# Patient Record
Sex: FEMALE | Race: WHITE | NOT HISPANIC OR LATINO | Employment: UNEMPLOYED | URBAN - METROPOLITAN AREA
[De-identification: names, ages, dates, MRNs, and addresses within clinical notes are randomized per-mention and may not be internally consistent; named-entity substitution may affect disease eponyms.]

---

## 2020-04-13 ENCOUNTER — TELEPHONE (OUTPATIENT)
Dept: GASTROENTEROLOGY | Facility: CLINIC | Age: 35
End: 2020-04-13

## 2020-04-13 ENCOUNTER — TELEMEDICINE (OUTPATIENT)
Dept: GASTROENTEROLOGY | Facility: CLINIC | Age: 35
End: 2020-04-13

## 2020-04-13 DIAGNOSIS — R19.8 FUNCTIONAL GASTROINTESTINAL SYMPTOMS: ICD-10-CM

## 2020-04-13 DIAGNOSIS — R10.9 RIGHT SIDED ABDOMINAL PAIN: ICD-10-CM

## 2020-04-13 DIAGNOSIS — K21.9 CHRONIC GERD: ICD-10-CM

## 2020-04-13 DIAGNOSIS — K21.9 CHRONIC GERD: Primary | ICD-10-CM

## 2020-04-13 DIAGNOSIS — R10.9 CHRONIC ABDOMINAL PAIN: Primary | ICD-10-CM

## 2020-04-13 DIAGNOSIS — G89.29 CHRONIC ABDOMINAL PAIN: Primary | ICD-10-CM

## 2020-04-13 PROCEDURE — 99203 OFFICE O/P NEW LOW 30 MIN: CPT | Performed by: NURSE PRACTITIONER

## 2020-04-13 NOTE — PROGRESS NOTES
GASTROENTEROLOGY OUTPATIENT NEW PATIENT NOTE Video/Telemedicine Visit  Patient: MARCI ANDERS : 1985  Date of Service: 2020  CC: Re-establishing care for abdominal pain    Assessment/Plan                                             ASSESSMENT & PLANS      Chronic abdominal pain r/t chronic GERD, IBS/functional GI sx, myofascia, and gynecologic cause  Functional gastrointestinal symptoms  · A trial of FODMAP and/or gluten-free diet.  Pt has multiple allergies.  Celiac serology negative.    Chronic GERD  · H-pylori breath test  · Start pantoprazole (PROTONIX) 40 MG EC tablet; Take 1 tablet by mouth Every Morning. Take on an EMPTY STOMACH 30 min to 1hr BEFORE the first meal of the day  · EGD if sx persists despite medical and dietary changes.     Follow Up:  RTC in 2 months.      DISCUSSION: The above plan was delineated in details with pt and all questions and concerns were answered.  Patient is also given contact information.  Patient is to return as scheduled or sooner if new problems arise.   I personally spent 30 minutes face to face (via video visit) with the patient in counseling and discussion and/or coordination of care as described above. This was an audio and video enabled telemedicine encounter.  Subjective                                                     SUBJECTIVE   History of Present Illness  Ms. Marci Anders is a 34 y.o. female presents as a telemedicine visit via video for a f/u on chronic abd pain.  Pt was last seen in 2016.     She continues to have persistent right side abd pain x 4 yrs.  Pain in RUQ and RLQ Pain is intermittent, occurring daily. Usually mild, but it gets severe once or twice a wk. Wakes pt up at night.  Sharp/stabbing.  Lasting for min or hours. Postprandial pain. Sometimes no triggered.   There is no alleviating factor for pain other than resting and waited out.    Patient has associated early satiety, nausea without vomiting. Nausea is relieved with  PRN Phenergan.  Reported unintentional wt loss of 8-10 lbs w/in 6 months    BM daily or 2-3 times a day. Taking benefiber once or twice a day. w/ soft loose stool. Strains b/c RLQ pain.     Only drinks water and limited caffeine (2 cups of coffee a day).  Seldom ETOH.   Doing PT intermittent about 4 yrs for chronic back and sciatica pain. Denies NSAIDS. Took Prednisone PRN, which works better than on muscle relaxants. She had 3 rounds of steroid w/in the last 15 months  Pt has myofascial pelvic pain syndrome, endometriosis, and L ovarian cyst. She had Mirenda placed in November (w/ plan for 6 months)    Allergic to bug wheat and barley, shellfish, tree nuts, pollens via allergies testing.  No celiac disease in the family, but mom feels better with being celiac diet  She has not been able to allergy shots as recommended d/t COVID-19     No recent EGD. She had an EGD at age 17 and reportedly it was WNL.   Colonoscopy, done on 3/23/16 by Dr Mathew d/t right side abd pain, was unrevealing. Random ileocolonic bx showed focal mucosal fibrosis and muciphage infiltration suggesting reparative process, colon mucosa    Review of Systems   A complete 10-system ROS performed and documented w/ pertinent findings included in HPI.    Subjective   PAST MED HX: Pt  has a past medical history of Asthma and Environmental allergies.  PAST SURG HX: Pt  has a past surgical history that includes  section and Breast lumpectomy (Right, ).  FAM HX: family history is not on file.  SOC HX: Pt  reports that she has never smoked. She does not have any smokeless tobacco history on file. She reports that she drinks alcohol.    Objective                                                           OBJECTIVE   Allergy: Pt is allergic to shellfish-derived products and penicillins.  MEDS: •  cetirizine (zyrTEC) 10 MG tablet, Take 10 mg by mouth Daily., Disp: , Rfl:   •  clindamycin (CLEOCIN T) 1 % lotion, Apply  topically to the appropriate area  as directed 2 (Two) Times a Day., Disp: , Rfl:     •  FIBER PO, Take  by mouth., Disp: , Rfl:   •  fluconazole (DIFLUCAN) 100 MG tablet, Take 100 mg by mouth Daily., Disp: , Rfl:   •  fluticasone (FLONASE) 50 MCG/ACT nasal spray, 2 sprays into the nostril(s) as directed by provider Daily., Disp: , Rfl:   •  methylPREDNISolone (MEDROL, THERESE,) 4 MG tablet, Take as directed on package instructions., Disp: 1 each, Rfl: 0 Prednisone eye drop x 9 days (completed 2 days so far)  •  montelukast (SINGULAIR) 10 MG tablet, Take 10 mg by mouth every night., Disp: , Rfl:   •  Probiotic Product (PROBIOTIC DAILY PO), Take  by mouth., Disp: , Rfl:   · MVI   · Aldactone for actne  Medications reviewed w/ pt    Lab Results   Component Value Date    WBC 6.71 04/06/2020    WBC 8.96 03/09/2020    WBC 6.22 01/29/2016    EOSABS 0.01 04/06/2020    EOSABS 0.01 03/09/2020    EOSABS 0.03 (L) 01/29/2016    HGB 14.0 04/06/2020    HGB 15.9 03/09/2020    HGB 13.6 01/29/2016    HCT 42.3 04/06/2020    HCT 46.4 (H) 03/09/2020    HCT 38.6 01/29/2016    MCV 93.4 04/06/2020    MCV 91.7 03/09/2020    MCV 84.1 01/29/2016    MCHC 33.1 04/06/2020    MCHC 34.3 03/09/2020    MCHC 35.2 01/29/2016     04/06/2020     03/09/2020     01/29/2016     Lab Results   Component Value Date    RDW 13.3 04/06/2020    RDW 12.8 03/09/2020    MCHC 33.1 04/06/2020    MCHC 34.3 03/09/2020    MCHC 35.2 01/29/2016    LABIRON 37 03/09/2020    FERRITIN 32.9 03/09/2020     Lab Results   Component Value Date     03/09/2020    K 4.3 03/09/2020    CL 97 (L) 03/09/2020    CO2 26 03/09/2020    BUN 15 03/09/2020    BUN 13 12/18/2018    BUN 11 08/20/2018    CREATININE 0.5 (L) 03/09/2020    CREATININE 0.5 (L) 12/18/2018    CREATININE 0.6 (L) 08/20/2018    GLUCOSE 78 01/29/2016    CALCIUM 9.5 03/09/2020    ANIONGAP 10 01/29/2016     Lab Results   Component Value Date    AST 30 03/09/2020    AST 24 12/18/2018    AST 23 08/20/2018    ALT 34 03/09/2020    ALT 35  12/18/2018    ALT 23 08/20/2018    ALKPHOS 53 03/09/2020    ALKPHOS 43 12/18/2018    ALKPHOS 45 08/20/2018    BILITOT 0.3 03/09/2020    BILITOT 0.4 12/18/2018    BILITOT 0.2 08/20/2018    ALBUMIN 5.0 03/09/2020    ALBUMIN 4.8 12/18/2018    ALBUMIN 4.6 08/20/2018      Lab Results   Component Value Date    HGBA1C 4.8 08/20/2018    TSH 1.840 03/09/2020    TSH 1.920 12/18/2018     No results found for: INR  Lab Results   Component Value Date    CRP 15.6 (H) 02/11/2016    .2 (H) 02/05/2016      Wt Readings from Last 5 Encounters:   02/24/20 58.5 kg (129 lb)   12/09/19 60.3 kg (133 lb)   04/14/16 60.3 kg (133 lb)   body mass index is unknown because there is no height or weight on file., , ,    Physical Exam    General Well developed; well nourished. NAD  Psych Oriented to time, place, and person.  Appropriate affect       Pt care team: Shyanne GANNON & TERESSA Mckinnon  04/13/20 1:31 PM  [facility]--Gastroenterology  [phone]    CC: , No Known   [facility] / [facility] [id number] FAX:None

## 2020-04-13 NOTE — TELEPHONE ENCOUNTER
Shyanne,  Patient called back.  Patient [facility]  Artemlavon Gutiérrezek can do her labs. Can you put in order please? Thanks

## 2020-04-16 ENCOUNTER — TELEPHONE (OUTPATIENT)
Dept: GASTROENTEROLOGY | Facility: CLINIC | Age: 35
End: 2020-04-16

## 2020-04-16 NOTE — TELEPHONE ENCOUNTER
Shyanne,  I called patient back. She stated that she her H pylori breath was negative. She is going to have Miranda fax results. Thanks

## 2020-04-22 ENCOUNTER — TELEPHONE (OUTPATIENT)
Dept: GASTROENTEROLOGY | Facility: CLINIC | Age: 35
End: 2020-04-22

## 2020-04-22 RX ORDER — PANTOPRAZOLE SODIUM 40 MG/1
40 TABLET, DELAYED RELEASE ORAL EVERY MORNING
Qty: 30 TABLET | Refills: 2 | Status: SHIPPED | OUTPATIENT
Start: 2020-04-22 | End: 2020-06-23 | Stop reason: SDUPTHER

## 2020-04-22 NOTE — TELEPHONE ENCOUNTER
----- Message from TERESSA Mckinnon sent at 4/22/2020 12:49 PM EDT -----  PLS let pt know that rx for protonix is sent and letter for gluten-free and IBS diet is sent

## 2020-04-22 NOTE — TELEPHONE ENCOUNTER
I called patient. Informed her that Protonix has been sent to pharmacy. She don't need to take Nexium anymore. Only Protonix. Gluten free and IBS diet literature will be mail to her. Patient voiced understanding.

## 2020-06-23 ENCOUNTER — TELEMEDICINE (OUTPATIENT)
Dept: GASTROENTEROLOGY | Facility: CLINIC | Age: 35
End: 2020-06-23

## 2020-06-23 DIAGNOSIS — Z88.9 MULTIPLE ALLERGIES: ICD-10-CM

## 2020-06-23 DIAGNOSIS — R19.8 FUNCTIONAL GASTROINTESTINAL SYMPTOMS: ICD-10-CM

## 2020-06-23 DIAGNOSIS — Z78.9 GLUTEN FREE DIET: ICD-10-CM

## 2020-06-23 DIAGNOSIS — K21.9 CHRONIC GERD: Primary | ICD-10-CM

## 2020-06-23 PROCEDURE — 99213 OFFICE O/P EST LOW 20 MIN: CPT | Performed by: NURSE PRACTITIONER

## 2020-06-23 RX ORDER — PANTOPRAZOLE SODIUM 40 MG/1
40 TABLET, DELAYED RELEASE ORAL EVERY MORNING
Qty: 30 TABLET | Refills: 0 | Status: SHIPPED | OUTPATIENT
Start: 2020-06-23 | End: 2020-08-19 | Stop reason: SDUPTHER

## 2020-06-23 NOTE — PROGRESS NOTES
GASTROENTEROLOGY OUTPATIENT ESTABLISHED PATIENT NOTE Video/Telemedicine Visit  Patient: MARCI ANDERS : 1985  Date of Service: 2020  CC: No chief complaint on file.    Assessment/Plan                                             ASSESSMENT & PLANS     Chronic GERD  -     Continue pantoprazole.  Refill given    Functional gastrointestinal symptoms  Gluten free diet Celiac serology negative, but pt clinically better w/ gluten-free diet.    -     Ambulatory Referral to Nutrition Services. Will defer doing an EGD w/ small bowel bx at this time since pt is clinically better w/ gluten-free diet and does not want to have a gluten-challenge diet before EGD dx    Multiple allergies  · Follow up w/ allergist for allergy shots as scheduled    Follow Up:  RTC in 6 months     DISCUSSION: The above plan was delineated in details with pt and all questions and concerns were answered.  Patient is also given contact information.  Patient is to return as scheduled or sooner if new problems arise.     Subjective                                                     SUBJECTIVE   History of Present Illness  Ms. Marci Anders is a 35 y.o. female presents as a telemedicine visit via video for a f/u on functional chronic abd pain.   Recent diet changes (gluten free) and starting on Protonix has helped w/ her right side abd pain.    Recently she had dental procedure done (gum graft). Had to eat clear liquid diet and had complication.  Pt required 2 wks of abx (Doxycline), oral steroid, and steroid injection.  W/ abx and steroid, her GI sx worsen.    She has taken more probiotic. Now she off all abx and steroid. Sx starting improving    Allergic to bug wheat and barley, shellfish, tree nuts, pollens via allergies testing.  No celiac disease in the family, but pt feels better with being celiac diet  She has not been able to allergy shots as recommended d/t COVID-19 Seeing allergist again soon.     No recent EGD. She had  distant EGD done and reportedly it was WNL.   Colonoscopy, done on 3/23/16 by Dr Quincy pedraza right side abd pain, was unrevealing. Random ileocolonic bx showed focal mucosal fibrosis and muciphage infiltration suggesting reparative process, colon mucosa    Review of Systems   ROS:Review of Systems  Objective                                                           OBJECTIVE   Allergy: Pt is allergic to shellfish-derived products and penicillins.  MEDS: •  cetirizine (zyrTEC) 10 MG tablet, Take 10 mg by mouth Daily., Disp: , Rfl:   •  clindamycin (CLEOCIN T) 1 % lotion, Apply  topically to the appropriate area as directed 2 (Two) Times a Day., Disp: , Rfl:     •  FIBER PO, Take  by mouth., Disp: , Rfl:     •  fluticasone (FLONASE) 50 MCG/ACT nasal spray, 2 sprays into the nostril(s) as directed by provider Daily., Disp: , Rfl:     •  montelukast (SINGULAIR) 10 MG tablet, Take 10 mg by mouth every night., Disp: , Rfl:   •  pantoprazole (PROTONIX) 40 MG EC tablet, Take 1 tablet by mouth Every Morning. Take on an EMPTY STOMACH 30 min to 1hr BEFORE the first meal of the day, Disp: 30 tablet, Rfl: 2  •  Probiotic Product (PROBIOTIC DAILY PO), Take  by mouth., Disp: , Rfl:     Medications reviewed w/ pt      4/6/20  Tissue Transglutaminase IgA Ab  0 - 14.9 U/mL <0.5    Comment: Negative < 15.0 U/mL   Tissue Transglutaminase IgG Ab  0 - 14.9 U/mL <0.8    Comment: Negative < 15.0 U/mL   Deamidated Gliadin Peptide IgA  0 - 14.9 U/mL <0.2    Comment: Negative < 15.0 U/mL   Deamidated Gliadin Peptide IgG  0 - 14.9 U/mL <0.4    Comment: Negative < 15.0 U/mL        Lab Results   Component Value Date    WBC 6.71 04/06/2020    WBC 8.96 03/09/2020    WBC 6.22 01/29/2016    EOSABS 0.01 04/06/2020    EOSABS 0.01 03/09/2020    EOSABS 0.03 (L) 01/29/2016    HGB 14.0 04/06/2020    HGB 15.9 03/09/2020    HGB 13.6 01/29/2016    HCT 42.3 04/06/2020    HCT 46.4 (H) 03/09/2020    HCT 38.6 01/29/2016    MCV 93.4 04/06/2020    MCV 91.7 03/09/2020     MCV 84.1 01/29/2016    MCHC 33.1 04/06/2020    MCHC 34.3 03/09/2020    MCHC 35.2 01/29/2016     04/06/2020     03/09/2020     01/29/2016     Lab Results   Component Value Date    RDW 13.3 04/06/2020    RDW 12.8 03/09/2020    MCHC 33.1 04/06/2020    MCHC 34.3 03/09/2020    MCHC 35.2 01/29/2016    LABIRON 37 03/09/2020    FERRITIN 32.9 03/09/2020     Lab Results   Component Value Date     03/09/2020    K 4.3 03/09/2020    CL 97 (L) 03/09/2020    CO2 26 03/09/2020    BUN 15 03/09/2020    BUN 13 12/18/2018    BUN 11 08/20/2018    CREATININE 0.5 (L) 03/09/2020    CREATININE 0.5 (L) 12/18/2018    CREATININE 0.6 (L) 08/20/2018    GLUCOSE 78 01/29/2016    CALCIUM 9.5 03/09/2020    ANIONGAP 10 01/29/2016     Lab Results   Component Value Date    AST 30 03/09/2020    AST 24 12/18/2018    AST 23 08/20/2018    ALT 34 03/09/2020    ALT 35 12/18/2018    ALT 23 08/20/2018    ALKPHOS 53 03/09/2020    ALKPHOS 43 12/18/2018    ALKPHOS 45 08/20/2018    BILITOT 0.3 03/09/2020    BILITOT 0.4 12/18/2018    BILITOT 0.2 08/20/2018    ALBUMIN 5.0 03/09/2020    ALBUMIN 4.8 12/18/2018    ALBUMIN 4.6 08/20/2018     No results found for: GGT, LIPASE  Lab Results   Component Value Date    HGBA1C 4.8 08/20/2018    TSH 1.840 03/09/2020    TSH 1.920 12/18/2018     No results found for: INR  Lab Results   Component Value Date    CRP 15.6 (H) 02/11/2016    .2 (H) 02/05/2016     No results found for: THCURSCR, PCPUR, COCAINEUR, METAMPSCNUR, LABOPIASCN, AMPHETSCREEN, LABBENZSCN, TRICYCLICSCN, LABMETHSCN, BARBITSCNUR, OXYCODONESCN, PROPOXSCN, BUPRENORSCNU  Wt Readings from Last 5 Encounters:   02/24/20 58.5 kg (129 lb)   12/09/19 60.3 kg (133 lb)   04/14/16 60.3 kg (133 lb)   body mass index is unknown because there is no height or weight on file., , ,    Physical Exam    General Well developed; well nourished. NAD  Psych Oriented to time, place, and person.  Appropriate affect       Pt care team: Shyanne Brunner  Petr GANNON & Shyanne Humphreys, TERESSA  06/23/20 9:35 AM  [facility]--Gastroenterology  [phone]    CC: , No Known   [facility] / [facility] [id number] FAX:None

## 2020-08-03 ENCOUNTER — HOSPITAL ENCOUNTER (OUTPATIENT)
Dept: NUTRITION | Facility: HOSPITAL | Age: 35
Setting detail: RECURRING SERIES
Discharge: HOME | End: 2020-08-03

## 2020-08-03 VITALS — HEIGHT: 62 IN | BODY MASS INDEX: 23.73 KG/M2 | WEIGHT: 128.97 LBS

## 2020-08-03 PROCEDURE — 97802 MEDICAL NUTRITION INDIV IN: CPT | Performed by: DIETITIAN, REGISTERED

## 2020-08-03 NOTE — CONSULTS
Adult Outpatient Nutrition  Assessment/PES    Patient Name:  Demetria Perera  YOB: 1985  MRN: [id number]    Assessment Date:  8/3/2020    Comments:  Telephone nutrition consult, 60 minutes. This medical referred consult was provided as a telephone call, tele-health or e-visit, as patient is unable to attend an in-office appointment due to the COVID-19 crisis. Consent for treatment was given verbally.    Patient describes problems with multiple food intolerances and weight loss. Pt has known allergies to buckwheat, barely, shellfish and tree nuts. States that she also does not tolerate gluten, lactose or onions. Per GI note, serology test for celiac was negative but pt has been unable to have EGD due to COVID pandemic. Patient currently following a gluten free diet and states that she feels well, but struggles to find variety of foods she can tolerate. Also notes 13 lb weight loss x 3 months from January to March of this year due to abdominal pain and indigestion. However notes that pain has since resolved and she has gained 7 lbs back. Wants to obtain information on gluten free alternatives for snack foods and quick meals.     During the session RD reviewed patient's allergen list and discussed basic principles of allergen avoidance such as ingestion, cross contamination and food preparation. Patient appears very well versed in these concepts. RD presented several gluten free products such as Schar, and LiveFree for patient to try. RD also discussed meal prep strategies such as par-prepping and prepping on weekends to make weeknights less hectic. Materials provided include [facility] Quick Gluten Free Meals, Gluten Free Snacks and supporting nutrition education materials.     Goals:  -continue gluten free diet  -maintain weight vs. Gain weight     Total of 60 minutes spent with patient on nutrition counseling. Education based on Academy of Dietetics and Nutrition guidelines. Patient was provided  "with RD's contact information. Follow up prn. Thank you for this referral.      General Info     Row Name 08/03/20 0903       Today's Session    Person(s) attending today's session  Patient     Services Used Today?  No       General Information    How Well Do You Speak English?  very well    Do You Speak a Language Other Than English at Home?  no    Are you able to read and write English?  Yes    Lives With  child(maryjane), dependent;spouse    Is patient pregnant?  no       Relationship/Environment    Name(s) of Who Lives With Patient  two young children,           Anthropometrics     Row Name 08/03/20 0923 08/03/20 0904       Anthropometrics    Height  157.5 cm (62.01\")  157.5 cm (62.01\")    Weight  --  58.5 kg (128 lb 15.5 oz)       Ideal Body Weight (IBW)    Ideal Body Weight (IBW) (kg)  50.45  50.45    % Ideal Body Weight  --  115.96       Body Mass Index (BMI)    BMI (kg/m2)  --  23.63        Nutritional Info/Activity     Row Name 08/03/20 0906       Nutritional Information    Have you had weight changes?  Yes    Describe weight changes  13 lb unintentional weight loss x 3 months due to abdo    What is your desired body weight?  58.5 kg (128 lb 15.5 oz)    Have you tried to lose weight before?  No    What is your motivation to lose weight?  maintian current    Food Allergies  fish/shellfish;peanut/tree nut;milk;other (see comments) buckwheat, barley    Supplemental Drinks/Foods/Additives  benefiber,     History of eating disorder?  No    What cultural diet influences are important for you to follow?  none    Do you have difficulty chewing food?  No    Functional Status  able to prepare meals;able to purchase food;ambulatory    List any food cravings/trigger foods you have  salty foods    List any food aversions  texture aversion to mushy foods    How often during the day do you find yourself snacking?  1-2 times    How often do you eat out and where?  2-3 times per week, pizza, chick bri A    Do " "you use Food Assistance programs (WIC, food stamps, food bank)?  no    Do you need information about Food Assistance programs?  no    What is the biggest challenge you have with your diet?  Food causing negative symptoms    Enter everything you can remember eating in the last 24 hours (1 day)  Breakfast: 2 eggs and granola bites Lunch: chips and queso, sweet potato fries, pramod salad, Dinner: GF pizza, salad       Eating Environment    Eating environment  Family       Physical Activity    Are you currently involved in an activity/exercise program?   Yes    Describe physical activity  running and HIIT workout    How many minutes do you spend on exercise each day?  60    How would you rank exercise as an important health lifestyle practice?  10        Home Nutrition Report     Row Name 08/03/20 0923 08/03/20 0906       Home Nutrition Report    Diet  No specific  --    Supplemental Drinks/Foods/Additives  benefiber, probiotics  benefiber,         Estimated/Assessed Needs     Row Name 08/03/20 0923 08/03/20 0904       Calculation Measurements    Weight Used For Calculations  58.5 kg (128 lb 15.5 oz)  --    Height  157.5 cm (62.01\")  157.5 cm (62.01\")       Estimated/Assessed Needs    Additional Documentation  Ponce-St. Jeor Equation (Group)  --       Ponce-St. Jeor Equation    RMR (Ponce-St. Jeor Equation)  1238.377  --    Ponce-St. Jeor Activity Factors  1.2  --    Activity Factors (Ponce-St. Jeor)  1486.0524  --          Labs/Tests/Procedures/Meds     Row Name 08/03/20 0923          Labs/Procedures/Meds    Lab Results Reviewed  reviewed        Diagnostic Tests/Procedures    Diagnostic Test/Procedure Reviewed  reviewed        Medications    Pertinent Medications Reviewed  reviewed             Problem/Interventions:  Problem 1     Row Name 08/03/20 0928          Nutrition Diagnoses Problem 1    Problem 1  Altered GI Function     Etiology (related to)  Medical Diagnosis     Gastrointestinal  GERD/Reflux;Other " (comment) multiple food allergies     Signs/Symptoms (evidenced by)  Report/Observation     Reported GI Symptoms  GI distress;Other (comment) positive allergy tests                 Intervention Goal     Row Name 08/03/20 0936          Intervention Goal    General  Meet nutritional needs for age/condition     PO  Meet estimated needs     Weight  Maintain weight           Nutrition Prescription     Row Name 08/03/20 0936          Nutrition Prescription PO    PO Prescription  Begin/change diet     Begin/Change Diet to  Regular     Fluid Consistency  Thin     Common Modifiers  Gluten Free     New PO Prescription Ordered?  No, recommended         Education/Evaluation     Row Name 08/03/20 0938          Education    Education  Education topics;Advised regarding habits/behavior;Provided education regarding     Provided education regarding  Avoidance/improvement of symptoms;Diet rationale     Education Topics  Gluten free     Advised Regarding Habits/Behavior  Eating out;Food prep;Label reading        Monitor/Evaluation    Monitor  Per protocol     Education Follow-up  Other (comment) 1 mo           Electronically signed by:  Aura Davis RD  08/03/20 09:59

## 2020-08-19 ENCOUNTER — TELEPHONE (OUTPATIENT)
Dept: GASTROENTEROLOGY | Facility: CLINIC | Age: 35
End: 2020-08-19

## 2020-08-19 DIAGNOSIS — K21.9 CHRONIC GERD: ICD-10-CM

## 2020-08-19 RX ORDER — PANTOPRAZOLE SODIUM 40 MG/1
40 TABLET, DELAYED RELEASE ORAL EVERY MORNING
Qty: 30 TABLET | Refills: 4 | Status: SHIPPED | OUTPATIENT
Start: 2020-08-19 | End: 2021-10-18 | Stop reason: ALTCHOICE

## 2020-08-19 NOTE — TELEPHONE ENCOUNTER
Dr Mccurdy,  I spoke with Ms Perera this morning. She is Shyanne's patient. Patient is requesting a refill for Pantoprazole 40 mg until she comes in to see you in January 2021.

## 2021-01-07 ENCOUNTER — TELEPHONE (OUTPATIENT)
Dept: OBSTETRICS AND GYNECOLOGY | Facility: CLINIC | Age: 36
End: 2021-01-07

## 2021-01-07 DIAGNOSIS — N83.201 CYST OF RIGHT OVARY: ICD-10-CM

## 2021-01-07 DIAGNOSIS — R10.2 PELVIC PAIN: Primary | ICD-10-CM

## 2021-01-07 NOTE — TELEPHONE ENCOUNTER
Patient left message on nurse line. Believes she has an ovarian cyst. Symptoms started 4-5 days ago and she was waiting to call to see if the pain went away. Wants to discuss with a nurse.

## 2021-01-13 ENCOUNTER — TELEPHONE (OUTPATIENT)
Dept: OBSTETRICS AND GYNECOLOGY | Facility: CLINIC | Age: 36
End: 2021-01-13

## 2021-01-13 NOTE — TELEPHONE ENCOUNTER
She lives in [place].  She feels she has a cyst and is having pelvic pressure and pain.  She called last week about issues.    She likely needs US and I can not schedule it.  Noted on appointment

## 2021-01-13 NOTE — TELEPHONE ENCOUNTER
Patient called back today and said she is having pain again, she wants to see about getting seen today

## 2021-01-14 ENCOUNTER — OFFICE VISIT (OUTPATIENT)
Dept: OBSTETRICS AND GYNECOLOGY | Facility: CLINIC | Age: 36
End: 2021-01-14

## 2021-01-14 VITALS
SYSTOLIC BLOOD PRESSURE: 118 MMHG | HEIGHT: 62 IN | BODY MASS INDEX: 26.31 KG/M2 | WEIGHT: 143 LBS | DIASTOLIC BLOOD PRESSURE: 83 MMHG

## 2021-01-14 DIAGNOSIS — R10.31 RLQ ABDOMINAL PAIN: ICD-10-CM

## 2021-01-14 DIAGNOSIS — D72.819 LEUKOPENIA, UNSPECIFIED TYPE: ICD-10-CM

## 2021-01-14 DIAGNOSIS — R10.2 PELVIC PAIN: Primary | ICD-10-CM

## 2021-01-14 LAB
BASOPHILS # BLD AUTO: 0.02 10*3/MM3 (ref 0–0.2)
BASOPHILS NFR BLD AUTO: 0.5 % (ref 0–1.5)
BILIRUB BLD-MCNC: NEGATIVE MG/DL
CLARITY, POC: CLEAR
COLOR UR: YELLOW
EOSINOPHIL # BLD AUTO: 0.02 10*3/MM3 (ref 0–0.4)
EOSINOPHIL NFR BLD AUTO: 0.5 % (ref 0.3–6.2)
ERYTHROCYTE [DISTWIDTH] IN BLOOD BY AUTOMATED COUNT: 12.1 % (ref 12.3–15.4)
GLUCOSE UR STRIP-MCNC: NEGATIVE MG/DL
HCT VFR BLD AUTO: 42.2 % (ref 34–46.6)
HGB BLD-MCNC: 14.7 G/DL (ref 12–15.9)
IMM GRANULOCYTES # BLD AUTO: 0.01 10*3/MM3 (ref 0–0.05)
IMM GRANULOCYTES NFR BLD AUTO: 0.3 % (ref 0–0.5)
KETONES UR QL: ABNORMAL
LEUKOCYTE EST, POC: NEGATIVE
LYMPHOCYTES # BLD AUTO: 1 10*3/MM3 (ref 0.7–3.1)
LYMPHOCYTES NFR BLD AUTO: 25.2 % (ref 19.6–45.3)
MCH RBC QN AUTO: 31.7 PG (ref 26.6–33)
MCHC RBC AUTO-ENTMCNC: 34.8 G/DL (ref 31.5–35.7)
MCV RBC AUTO: 91.1 FL (ref 79–97)
MONOCYTES # BLD AUTO: 0.29 10*3/MM3 (ref 0.1–0.9)
MONOCYTES NFR BLD AUTO: 7.3 % (ref 5–12)
NEUTROPHILS # BLD AUTO: 2.63 10*3/MM3 (ref 1.7–7)
NEUTROPHILS NFR BLD AUTO: 66.2 % (ref 42.7–76)
NITRITE UR-MCNC: NEGATIVE MG/ML
NRBC BLD AUTO-RTO: 0 /100 WBC (ref 0–0.2)
PH UR: 6.5 [PH] (ref 5–8)
PLATELET # BLD AUTO: 226 10*3/MM3 (ref 140–450)
PROT UR STRIP-MCNC: NEGATIVE MG/DL
RBC # BLD AUTO: 4.63 10*6/MM3 (ref 3.77–5.28)
RBC # UR STRIP: NEGATIVE /UL
SP GR UR: 1.02 (ref 1–1.03)
UROBILINOGEN UR QL: NORMAL
WBC # BLD AUTO: 3.97 10*3/MM3 (ref 3.4–10.8)

## 2021-01-14 PROCEDURE — 99213 OFFICE O/P EST LOW 20 MIN: CPT | Performed by: NURSE PRACTITIONER

## 2021-01-14 PROCEDURE — 81003 URINALYSIS AUTO W/O SCOPE: CPT | Performed by: NURSE PRACTITIONER

## 2021-01-14 RX ORDER — PREDNISOLONE ACETATE 10 MG/ML
SUSPENSION/ DROPS OPHTHALMIC AS NEEDED
COMMUNITY
Start: 2020-04-12

## 2021-01-14 NOTE — PROGRESS NOTES
Chief Complaint   Patient presents with   • Follow-up   • Breast Problem   • Ovarian Cyst       Subjective   HPI  Demetria Perera is a 35 y.o. female, , who presents for possible ovarian cyst and bilateral breast tenderness since last menses.      She reports last Thursday she started having abdominal pressure, pain, pinching, weight gain and clear discharge. She has a h/x of ovarian cysts and feels like this is the same symptoms she has had before. She denies any fever or chills. She reports the pain increases after she eats at night. She has changed her diet since April to no carb's or gluten and has continued to gain weight unexpectedly. She states she has also had bilateral breast tenderness. She denies any AUB. She had labs with PCP in Nov and they were normal except slight low WBC.  Vaginal discharge, pain, and breast tenderness have all improved over the last week.    Her last LMP was Patient's last menstrual period was 2020..  Periods are regular every 28-30 days, lasting 6 days.  Dysmenorrhea:severe, occurring first 1-2 days of flow.  Patient reports problems with: none.  Partner Status: Marital Status: .  New Partners since last visit: no.  Desires STD Screening: no.    Additional OB/GYN History   Current contraception: contraceptive methods: Vasectomy   Desires to: do not start contraception. Does not do well with hormones  Last Pap : 2020  Last Completed Pap Smear       Status Date      PAP SMEAR Done 2020 Negative        History of abnormal Pap smear: yes - colposcopy, LEEP in   Tobacco Usage?: No   OB History        3    Para   2    Term   2       0    AB   1    Living   2       SAB        TAB        Ectopic        Molar        Multiple        Live Births                    Health Maintenance   Topic Date Due   • Annual Gynecologic Pelvic and Breast Exam  1986   • ANNUAL PHYSICAL  06/10/1989   • TDAP/TD VACCINES (1 - Tdap) 06/10/2005   • HEPATITIS C  "SCREENING  04/13/2020   • INFLUENZA VACCINE  08/01/2020   • PAP SMEAR  05/12/2023   • Pneumococcal Vaccine 0-64  Aged Out   • MENINGOCOCCAL VACCINE  Aged Out       The additional following portions of the patient's history were reviewed and updated as appropriate: allergies, current medications, past family history, past medical history, past social history, past surgical history and problem list.    Review of Systems   Constitutional: Negative.    HENT: Negative.    Eyes: Negative.    Respiratory: Negative.    Cardiovascular: Negative.    Gastrointestinal: Positive for abdominal pain. Negative for blood in stool, constipation, diarrhea, nausea and vomiting.   Endocrine: Negative.    Genitourinary: Positive for breast pain, frequency, pelvic pressure and vaginal discharge. Negative for dysuria.   Musculoskeletal: Negative.    Skin: Negative.    Allergic/Immunologic: Negative.    Neurological: Negative.    Hematological: Negative.    Psychiatric/Behavioral: Negative.        I have reviewed and agree with the HPI, ROS, and historical information as entered above. Mary Batista, APRN    Objective   /83   Ht 157.5 cm (62\")   Wt 64.9 kg (143 lb)   LMP 12/27/2020   Breastfeeding No   BMI 26.16 kg/m²     Physical Exam  Vitals signs and nursing note reviewed. Exam conducted with a chaperone present.   Constitutional:       Appearance: Normal appearance.   Pulmonary:      Effort: Pulmonary effort is normal.   Abdominal:      Palpations: Abdomen is soft.      Tenderness: There is generalized abdominal tenderness and tenderness in the right lower quadrant. There is no guarding or rebound. Negative signs include Schrader's sign.   Genitourinary:     Labia:         Right: No rash, tenderness or lesion.         Left: No rash, tenderness or lesion.       Vagina: Vaginal discharge present. No lesions.      Cervix: No cervical motion tenderness, discharge, lesion or cervical bleeding.      Uterus: Normal. Not enlarged, " not fixed and not tender.       Adnexa:         Right: No mass or tenderness.          Left: No mass or tenderness.        Rectum: No external hemorrhoid.      Comments: Normal white discharge noted. Chaperone Present  Neurological:      Mental Status: She is alert.         Assessment/Plan     Assessment     Problem List Items Addressed This Visit     None      Visit Diagnoses     Pelvic pain    -  Primary    Relevant Orders    US Non-ob Transvaginal    Leukopenia, unspecified type        Relevant Orders    CBC & Differential    RLQ abdominal pain              1. U/S today reveals normal uterus and ovaries with 1 cm left follicular cyst only. CCUA negative. Vasectomy for contraception. Declines STD screen.  Pain is improving. It is possible she ruptured a cyst that has resolved but no evidence on U/S today.   2. She admits increased stool frequency and hx of GI problems. She has F/U scheduled with GI 1-2 months.   3. WBC at PCP was 3.3 and she was supposed to have it repeated. We will do that today    Plan     Return for Annual physical as scheduled in May.  2. If pain persists and GI workup is negative she can discuss dx lap with [facility]. She declines hormones due to mood/emotional changes with them.   3. Will call only if labs are abnormal  4. Call if breast tenderness persists.      Mary Batista, APRN  01/14/2021

## 2021-02-04 ENCOUNTER — OFFICE VISIT (OUTPATIENT)
Dept: GASTROENTEROLOGY | Facility: CLINIC | Age: 36
End: 2021-02-04

## 2021-02-04 VITALS
OXYGEN SATURATION: 98 % | DIASTOLIC BLOOD PRESSURE: 90 MMHG | HEIGHT: 62 IN | BODY MASS INDEX: 27.6 KG/M2 | SYSTOLIC BLOOD PRESSURE: 132 MMHG | WEIGHT: 150 LBS | TEMPERATURE: 95.7 F | RESPIRATION RATE: 16 BRPM | HEART RATE: 97 BPM

## 2021-02-04 DIAGNOSIS — R10.11 RIGHT UPPER QUADRANT ABDOMINAL PAIN: Primary | ICD-10-CM

## 2021-02-04 PROBLEM — J32.9 SINUSITIS: Status: ACTIVE | Noted: 2020-05-29

## 2021-02-04 PROBLEM — B00.1 RECURRENT HERPES LABIALIS: Status: ACTIVE | Noted: 2020-05-29

## 2021-02-04 PROBLEM — H61.23 CERUMEN DEBRIS ON TYMPANIC MEMBRANE OF BOTH EARS: Status: ACTIVE | Noted: 2020-05-29

## 2021-02-04 PROBLEM — M26.621 ARTHRALGIA OF RIGHT TEMPOROMANDIBULAR JOINT: Status: ACTIVE | Noted: 2020-05-29

## 2021-02-04 PROBLEM — H92.01 OTALGIA OF RIGHT EAR: Status: ACTIVE | Noted: 2020-05-29

## 2021-02-04 PROBLEM — K05.6 PERIODONTAL DISEASE: Status: ACTIVE | Noted: 2020-05-29

## 2021-02-04 PROCEDURE — 99213 OFFICE O/P EST LOW 20 MIN: CPT | Performed by: INTERNAL MEDICINE

## 2021-02-04 NOTE — PROGRESS NOTES
Patient Name: Demetria Perera  YOB: 1985   Medical Record number: [id number]     PCP: An Aguila    Chief Complaint   Patient presents with   • Heartburn     6 month follow up       History of Present Illness:   HPI  Mrs. Perera returns to the office for follow-up visit.  The patient is continuing to do well on a gluten-free diet.  She does have history of significant allergies and had an episode of anaphylaxis with beginning allergy shots.  She was given a course of steroids in the past with relief.  The patient is not taking Protonix at this time.  There is no history of difficult or painful swallowing.  She denies any heartburn.  Mrs. Perera occasionally will have some discomfort in the right upper abdomen just under the rib cage.  The discomfort can be related to meals at times.  Mrs. Perera describes the discomfort as crampy and sometimes sharp.  There is no known history of gallstones or other gallbladder problems.  The patient has regular bowel habits at this time without any bleeding.  She has no unexplained weight loss.  There is no history of night sweats, fever or chills.  Past Medical History:   Diagnosis Date   • Asthma    • Environmental allergies        Past Surgical History:   Procedure Laterality Date   • BREAST LUMPECTOMY Right    •  SECTION           Current Outpatient Medications:   •  ALLERGY SERUM SUBLINGUAL DROPS, Place  under the tongue 1 (One) Time., Disp: , Rfl:   •  cetirizine (zyrTEC) 10 MG tablet, Take 10 mg by mouth Daily., Disp: , Rfl:   •  clindamycin (CLEOCIN T) 1 % lotion, Apply  topically to the appropriate area as directed 2 (Two) Times a Day., Disp: , Rfl:   •  FIBER PO, Take  by mouth., Disp: , Rfl:   •  fluticasone (FLONASE) 50 MCG/ACT nasal spray, 2 sprays into the nostril(s) as directed by provider Daily., Disp: , Rfl:   •  montelukast (SINGULAIR) 10 MG tablet, Take 10 mg by mouth every night., Disp: , Rfl:   •  prednisoLONE acetate  (PRED FORTE) 1 % ophthalmic suspension, , Disp: , Rfl:   •  Probiotic Product (PROBIOTIC DAILY PO), Take  by mouth., Disp: , Rfl:   •  pantoprazole (PROTONIX) 40 MG EC tablet, Take 1 tablet by mouth Every Morning. Take on an EMPTY STOMACH 30 min to 1hr BEFORE the first meal of the day, Disp: 30 tablet, Rfl: 4    Allergies   Allergen Reactions   • Shellfish-Derived Products Anaphylaxis   • Sulfamethoxazole-Trimethoprim Provider Review Needed     Mouth swelling   • Barley Grass Diarrhea and Nausea Only   • Buckwheat Diarrhea, GI Intolerance and Nausea And Vomiting   • Latex Other (See Comments)     Face swelled    • Nuts Unknown - High Severity   • Penicillins Rash       No family history on file.    Social History     Socioeconomic History   • Marital status:      Spouse name: Not on file   • Number of children: Not on file   • Years of education: Not on file   • Highest education level: Not on file   Tobacco Use   • Smoking status: Never Smoker   • Smokeless tobacco: Never Used   Substance and Sexual Activity   • Alcohol use: Not Currently   • Drug use: Never   • Sexual activity: Yes     Partners: Male     Birth control/protection: Surgical     Comment: Vasectomy       Review of Systems   Constitutional: Negative.  Negative for appetite change, fatigue, fever and unexpected weight change.   HENT: Negative for dental problem, mouth sores, postnasal drip, sneezing, trouble swallowing and voice change.    Eyes: Negative.  Negative for pain, redness and itching.   Respiratory: Negative.  Negative for cough, shortness of breath and wheezing.    Cardiovascular: Negative.  Negative for chest pain, palpitations and leg swelling.   Gastrointestinal: Positive for abdominal pain and nausea. Negative for abdominal distention, anal bleeding, blood in stool, constipation, diarrhea, rectal pain and vomiting.   Endocrine: Negative.  Negative for cold intolerance, heat intolerance, polydipsia and polyuria.   Genitourinary:  Positive for frequency. Negative for dysuria, enuresis, flank pain, hematuria and urgency.   Musculoskeletal: Positive for arthralgias and myalgias. Negative for back pain and joint swelling.   Skin: Negative.  Negative for color change, pallor and rash.   Allergic/Immunologic: Negative.  Negative for environmental allergies, food allergies and immunocompromised state.   Neurological: Positive for headaches. Negative for dizziness, tremors, seizures, facial asymmetry and numbness.   Hematological: Negative.    Psychiatric/Behavioral: Negative.  Negative for behavioral problems, dysphoric mood, hallucinations and self-injury.       Vitals:    02/04/21 1516   BP: 132/90   Pulse: 97   Resp: 16   Temp: 95.7 °F (35.4 °C)   SpO2: 98%       Physical Exam  Vitals signs reviewed.   Constitutional:       General: She is not in acute distress.     Appearance: Normal appearance.   HENT:      Head: Normocephalic and atraumatic.      Nose: Nose normal.      Mouth/Throat:      Mouth: Mucous membranes are moist.      Pharynx: Oropharynx is clear.   Eyes:      General: No scleral icterus.     Extraocular Movements: Extraocular movements intact.   Neck:      Musculoskeletal: Normal range of motion. No neck rigidity.   Cardiovascular:      Rate and Rhythm: Normal rate and regular rhythm.      Heart sounds: No murmur. No gallop.    Pulmonary:      Effort: Pulmonary effort is normal.      Breath sounds: No wheezing or rales.   Abdominal:      General: Abdomen is flat.      Palpations: Abdomen is soft.      Tenderness: There is abdominal tenderness (right upper quadrant). There is no guarding.   Musculoskeletal: Normal range of motion.         General: No swelling.   Skin:     General: Skin is warm and dry.      Coloration: Skin is not jaundiced.   Neurological:      General: No focal deficit present.      Mental Status: She is alert and oriented to person, place, and time.   Psychiatric:         Mood and Affect: Mood normal.          Thought Content: Thought content normal.         Judgment: Judgment normal.         Diagnoses and all orders for this visit:    1. Right upper quadrant abdominal pain (Primary)  -     US Liver; Future    The patient has some right upper quadrant abdominal pain.  There are no recent imaging studies to evaluate the gallbladder.  Differential does include biliary colic.      Plan: Will schedule ultrasound of right upper quadrant to evaluate for gallstones.           The patient will follow-up in 6 months.    Answers for HPI/ROS submitted by the patient on 1/28/2021   Abdominal pain  What is the primary reason for your visit?: Abdominal Pain  Answers for HPI/ROS submitted by the patient on 1/28/2021   Abdominal pain  What is the primary reason for your visit?: Abdominal Pain    Answers for HPI/ROS submitted by the patient on 1/28/2021   Abdominal pain  What is the primary reason for your visit?: Abdominal Pain

## 2021-02-17 ENCOUNTER — HOSPITAL ENCOUNTER (OUTPATIENT)
Dept: ULTRASOUND IMAGING | Facility: HOSPITAL | Age: 36
End: 2021-02-17

## 2021-03-03 ENCOUNTER — HOSPITAL ENCOUNTER (OUTPATIENT)
Dept: ULTRASOUND IMAGING | Facility: HOSPITAL | Age: 36
Discharge: HOME | End: 2021-03-03
Admitting: INTERNAL MEDICINE

## 2021-03-03 DIAGNOSIS — R10.11 RIGHT UPPER QUADRANT ABDOMINAL PAIN: ICD-10-CM

## 2021-03-03 PROCEDURE — 76705 ECHO EXAM OF ABDOMEN: CPT

## 2021-03-05 ENCOUNTER — TELEPHONE (OUTPATIENT)
Dept: GASTROENTEROLOGY | Facility: CLINIC | Age: 36
End: 2021-03-05

## 2021-03-05 DIAGNOSIS — R10.13 EPIGASTRIC PAIN: ICD-10-CM

## 2021-03-05 DIAGNOSIS — R10.11 RIGHT UPPER QUADRANT ABDOMINAL PAIN: Primary | ICD-10-CM

## 2021-03-05 NOTE — TELEPHONE ENCOUNTER
Dr Mccurdy,  I spoke with Ms Perera. Ultrasound results given. Patient stated that she is still having the upper abdominal pain. Thanks

## 2021-03-05 NOTE — TELEPHONE ENCOUNTER
----- Message from Prabhakar Mccurdy MD sent at 3/4/2021  5:48 PM EST -----  Let MsJj Perera know there was no evidence of gallstones on the ultrasound.  If pain in the right upper abdomen continues the next step would be to get a HIDA scan with [facility].  Thank you,  ANJELICA

## 2021-03-29 ENCOUNTER — HOSPITAL ENCOUNTER (OUTPATIENT)
Dept: NUCLEAR MEDICINE | Facility: HOSPITAL | Age: 36
Discharge: HOME | End: 2021-03-29

## 2021-03-29 DIAGNOSIS — R10.13 EPIGASTRIC PAIN: ICD-10-CM

## 2021-03-29 DIAGNOSIS — R10.11 RIGHT UPPER QUADRANT ABDOMINAL PAIN: ICD-10-CM

## 2021-03-29 PROCEDURE — 0 TECHNETIUM TC 99M MEBROFENIN KIT: Performed by: INTERNAL MEDICINE

## 2021-03-29 PROCEDURE — 78227 HEPATOBIL SYST IMAGE W/DRUG: CPT

## 2021-03-29 PROCEDURE — A9537 TC99M MEBROFENIN: HCPCS | Performed by: INTERNAL MEDICINE

## 2021-03-29 RX ORDER — KIT FOR THE PREPARATION OF TECHNETIUM TC 99M MEBROFENIN 45 MG/10ML
1 INJECTION, POWDER, LYOPHILIZED, FOR SOLUTION INTRAVENOUS
Status: COMPLETED | OUTPATIENT
Start: 2021-03-29 | End: 2021-03-29

## 2021-03-29 RX ADMIN — MEBROFENIN 1 DOSE: 45 INJECTION, POWDER, LYOPHILIZED, FOR SOLUTION INTRAVENOUS at 08:20

## 2021-03-30 ENCOUNTER — TELEPHONE (OUTPATIENT)
Dept: GASTROENTEROLOGY | Facility: CLINIC | Age: 36
End: 2021-03-30

## 2021-04-05 NOTE — TELEPHONE ENCOUNTER
I SPOKE WITH THE PATIENT TODAY AND SHE DOESN'T WANT TO DO THIS IN THE NEXT 2 WEEKS NOW, SHE WILL BE GOING OUT OF THE [place]TRY AND WANTS TO WAIT UNTIL THE END OF MAY OR FIRST WEEK OF June AND WILL CALL US WHEN SHE IS READY TO GET IT SCHLD.

## 2021-04-16 ENCOUNTER — BULK ORDERING (OUTPATIENT)
Dept: CASE MANAGEMENT | Facility: OTHER | Age: 36
End: 2021-04-16

## 2021-04-16 DIAGNOSIS — Z23 IMMUNIZATION DUE: ICD-10-CM

## 2021-05-17 ENCOUNTER — TELEPHONE (OUTPATIENT)
Dept: OBSTETRICS AND GYNECOLOGY | Facility: CLINIC | Age: 36
End: 2021-05-17

## 2021-05-17 NOTE — TELEPHONE ENCOUNTER
Pt has been having some problems with spotting prior to starting her periods over the past few months. This month she had a heavy period that started on 5/9 and lasted 5 days   Finished a period recently that was heavy and then started bleeding again and reports breast pain.  Pt is not currently on birth control but says  has had a vasectomy. Recommend pt taking a UPT and keeping scheduled appt on 5/20 with US. Gamaliel VU

## 2021-05-17 NOTE — TELEPHONE ENCOUNTER
Patient is having abnormal bleeding, she is wanting to worried about it due to her annual and she has already had a cycle this month, she is also having breast pain.

## 2021-05-20 ENCOUNTER — OFFICE VISIT (OUTPATIENT)
Dept: OBSTETRICS AND GYNECOLOGY | Facility: CLINIC | Age: 36
End: 2021-05-20

## 2021-05-20 VITALS — BODY MASS INDEX: 25.02 KG/M2 | WEIGHT: 136.8 LBS | SYSTOLIC BLOOD PRESSURE: 118 MMHG | DIASTOLIC BLOOD PRESSURE: 78 MMHG

## 2021-05-20 DIAGNOSIS — R10.2 PELVIC PAIN: ICD-10-CM

## 2021-05-20 DIAGNOSIS — Z01.419 WOMEN'S ANNUAL ROUTINE GYNECOLOGICAL EXAMINATION: Primary | ICD-10-CM

## 2021-05-20 PROCEDURE — 99395 PREV VISIT EST AGE 18-39: CPT | Performed by: NURSE PRACTITIONER

## 2021-05-20 PROCEDURE — 99213 OFFICE O/P EST LOW 20 MIN: CPT | Performed by: NURSE PRACTITIONER

## 2021-05-20 RX ORDER — VALACYCLOVIR HYDROCHLORIDE 1 G/1
TABLET, FILM COATED ORAL AS NEEDED
COMMUNITY
Start: 2021-04-03

## 2021-05-20 NOTE — PROGRESS NOTES
GYN Annual Exam     CC - Here for annual exam.        HPI  Demetria Perera is a 35 y.o. female, , who presents for annual well woman exam. Patient's last menstrual period was 2021 (exact date)..  Periods are regular every 25-35 days, lasting 6 days. .  Dysmenorrhea:severe, occurring first 1-2 days of flow.  Patient reports problems with: none.  There were no changes to her medical or surgical history since her last visit.. Partner Status: Marital Status: .  She is sexually active. She has not had new partners since her last STD testing. She does not desire STD testing. .    She also had a TVUS today for RLQ pain.  Ultrasound showed her endometrial thickness is 5.4 mm.  There is a trace amount of fluid present at the c/s site.  Both ovaries were visualized, and there was a cyst noted on the right ovary that measured 19.4 x 18.6 x 18.6 mm.  It was mostly cystic with a few scattered echoes with v/s reverb. There was also a cystic area with a septation present medial to the left ovary, measuring 25.6 x 13.5 x 20.3 mm.      RLQ and generalized pelvic pain has been occurring for several years.  She has been multiple times in the past, and needs to be seen with Dr. Dorman to discuss management of Endometriosis. She reports that when she initially called on Monday, she had Annual appt scheduled and she was having severe RLQ pain.  In addition to the pain, she began bleeding three days after finishing her period, she began to have heavy bleeding with tissue.  She says that bleeding has since stopped, but she has continued to pass tissue into today.  When calling on Monday, she was advised to take a UPT, even though her  has had a vasectomy.      She also has pain on the left side of her vagina, that feels like nerve pain with wiping.  She also reports that she has had significant pressure and discomfort with intercourse.       She reports in addition to changes in bleeding and RLQ pain, she reports  that she has been having significant breast tenderness with cycles.  He denies any breast lumps.  Breast tenderness is ongoing for several years for her.        Additional OB/GYN History   Current contraception: contraceptive methods: Vasectomy   Desires to: do not start contraception  Last Pap : 2020- HPV regardless, normal   Last Completed Pap Smear          Ordered - PAP SMEAR (Every 3 Years) Ordered on 2020  Done - Negative              History of abnormal Pap smear: yes - h/o colpo & LEEP in   Family history of uterine, colon, breast, or ovarian cancer: no  Performs monthly Self-Breast Exam: yes  Exercises Regularly:yes  Feelings of Anxiety or Depression: no  Tobacco Usage?: No   OB History        3    Para   2    Term   2       0    AB   1    Living           SAB   1    TAB        Ectopic        Molar        Multiple        Live Births                    Health Maintenance   Topic Date Due   • Annual Gynecologic Pelvic and Breast Exam  Never done   • ANNUAL PHYSICAL  Never done   • Pneumococcal Vaccine 0-64 (1 of 1 - PPSV23) Never done   • COVID-19 Vaccine (1) Never done   • TDAP/TD VACCINES (1 - Tdap) Never done   • HEPATITIS C SCREENING  Never done   • INFLUENZA VACCINE  2021   • PAP SMEAR  2023       The additional following portions of the patient's history were reviewed and updated as appropriate: allergies, current medications, past family history, past medical history, past social history, past surgical history and problem list.    Review of Systems   Constitutional: Negative.    Respiratory: Negative.    Cardiovascular: Negative.    Gastrointestinal: Negative.    Endocrine: Negative.    Genitourinary: Positive for breast pain, dyspareunia, pelvic pain (RLQ), pelvic pressure, vaginal bleeding and vaginal pain.   Neurological: Negative.    Psychiatric/Behavioral: Negative.          I have reviewed and agree with the HPI, ROS, and historical  information as entered above. Mary Batista, APRN    Objective   /78   Wt 62.1 kg (136 lb 12.8 oz)   LMP 05/09/2021 (Exact Date)   Breastfeeding No   BMI 25.02 kg/m²     Physical Exam  Vitals and nursing note reviewed. Exam conducted with a chaperone present.   Constitutional:       Appearance: She is well-developed.   HENT:      Head: Normocephalic and atraumatic.   Neck:      Thyroid: No thyroid mass or thyromegaly.   Cardiovascular:      Rate and Rhythm: Normal rate and regular rhythm.      Heart sounds: No murmur heard.     Pulmonary:      Effort: Pulmonary effort is normal. No retractions.      Breath sounds: Normal breath sounds. No wheezing, rhonchi or rales.   Chest:      Chest wall: No mass or tenderness.      Breasts:         Right: Normal. No mass, nipple discharge, skin change or tenderness.         Left: Normal. No mass, nipple discharge, skin change or tenderness.   Abdominal:      Palpations: Abdomen is soft. Abdomen is not rigid. There is no mass.      Tenderness: There is no abdominal tenderness. There is no guarding.      Hernia: No hernia is present. There is no hernia in the left inguinal area.   Genitourinary:     Labia:         Right: No rash, tenderness or lesion.         Left: No rash, tenderness or lesion.       Vagina: Normal. No vaginal discharge or lesions.      Cervix: No cervical motion tenderness, discharge, lesion or cervical bleeding.      Uterus: Normal. Not enlarged, not fixed and not tender.       Adnexa:         Right: No mass or tenderness.          Left: No mass or tenderness.        Rectum: No external hemorrhoid.   Musculoskeletal:      Cervical back: Normal range of motion. No muscular tenderness.   Neurological:      Mental Status: She is alert and oriented to person, place, and time.   Psychiatric:         Behavior: Behavior normal.            Assessment and Plan    Problem List Items Addressed This Visit        Gastrointestinal Abdominal     Pelvic pain     Overview     Ongoing pelvic pain and dyspareunia x3 years.  She has known endometriosis and history of  x2.  She does not tolerate hormonal contraception well.  She failed MOIZ and Mirena IUD.  Her  has a vasectomy.            Other Visit Diagnoses     Women's annual routine gynecological examination    -  Primary    Relevant Orders    Pap IG, Rfx HPV ASCU        We discussed the option of Orilissa and diagnostic lap.  Brochures and information were given on both.  She will make an appointment to return to the office and discuss with Dr. Dorman.  Ultrasound today revealed endometrial thickness 5.4 mm.  Right ovarian cyst measuring 19 x 18 x 18 mm, left ovarian cyst measuring 25 x 13 x 20 mm.  No free fluid in the cul-de-sac    1. GYN annual well woman exam.   2. Reviewed monthly self breast exams.  Instructed to call with lumps, pain, or breast discharge.    3. Fibrocystic breast changes - Encouraged decreasing caffeine, supportive bra, low dose vitamin E supplementation.  4. RTC in 1 year or PRN with problems      Mary Batista, APRN  2021

## 2021-05-25 DIAGNOSIS — Z01.419 WOMEN'S ANNUAL ROUTINE GYNECOLOGICAL EXAMINATION: ICD-10-CM

## 2021-05-28 ENCOUNTER — OFFICE VISIT (OUTPATIENT)
Dept: OBSTETRICS AND GYNECOLOGY | Facility: CLINIC | Age: 36
End: 2021-05-28

## 2021-05-28 VITALS
HEIGHT: 62 IN | BODY MASS INDEX: 25.1 KG/M2 | WEIGHT: 136.4 LBS | DIASTOLIC BLOOD PRESSURE: 78 MMHG | SYSTOLIC BLOOD PRESSURE: 118 MMHG

## 2021-05-28 DIAGNOSIS — N94.6 DYSMENORRHEA: Primary | ICD-10-CM

## 2021-05-28 DIAGNOSIS — R10.2 PELVIC PAIN: ICD-10-CM

## 2021-05-28 PROCEDURE — 99213 OFFICE O/P EST LOW 20 MIN: CPT | Performed by: OBSTETRICS & GYNECOLOGY

## 2021-05-28 NOTE — PROGRESS NOTES
Chief Complaint   Patient presents with   • Follow-up       Subjective   HPI  Demetria Perera is a 35 y.o. female, , who presents for consult with Dr. Dorman for management of pelvic pain, dyspareunia, menorrhagia, and (potentially) endometriosis. Patient last seen in office for annual by TERESSA Earl on 2021 (with U/S). Was given information on [place] and diagnostic laparoscopy; not interested in [place], as one of the side effects listed was possible depression.    She states she has experienced this problem since her menstruals started.  She describes the severity as severe.  She states that the problem is worsening.  The patient reports additional symptoms as shortening that began within the past 4-5 years and increased breast pain within the past 6 months. Also describes increased vaginal nerve pain during her menses.    Her last LMP was Patient's last menstrual period was 2021 (exact date).  Periods are regular every 25 days, lasting 6 days, described as heavy with clots. Soaks through super pad/tampon within 30min to an hour. Dysmenorrhea: severe, occurring first 1-3 days of flow.  Partner Status: Marital Status: .  New Partners since last visit: no.  Desires STD Screening: no.    Additional OB/GYN History   Current contraception: contraceptive methods: Vasectomy ; She does not tolerate hormonal contraception well.  She failed MOIZ and Mirena IUD (both caused depression).    Last Pap :   Last Completed Pap Smear          Ordered - PAP SMEAR (Every 3 Years) Ordered on 2021  SCANNED - PAP SMEAR    2020  Done - Negative              History of abnormal Pap smear: yes - led to LEEP  Last mammogram:   Last Completed Mammogram     This patient has no relevant Health Maintenance data.        Tobacco Usage?: No   OB History        3    Para   2    Term   2       0    AB   1    Living   2       SAB   1    TAB        Ectopic        Molar        Multiple  "       Live Births   2                Health Maintenance   Topic Date Due   • ANNUAL PHYSICAL  Never done   • Pneumococcal Vaccine 0-64 (1 of 1 - PPSV23) Never done   • COVID-19 Vaccine (1) Never done   • TDAP/TD VACCINES (1 - Tdap) Never done   • HEPATITIS C SCREENING  Never done   • INFLUENZA VACCINE  08/01/2021   • Annual Gynecologic Pelvic and Breast Exam  05/21/2022   • PAP SMEAR  05/20/2024       The additional following portions of the patient's history were reviewed and updated as appropriate: allergies, current medications, past family history, past medical history, past social history, past surgical history and problem list.    Review of Systems   Constitutional: Negative.    HENT: Negative.    Eyes: Negative.    Respiratory: Negative.    Cardiovascular: Negative.    Gastrointestinal: Negative.    Endocrine: Negative.    Genitourinary: Positive for dyspareunia, menstrual problem and pelvic pain.   Musculoskeletal: Negative.    Skin: Negative.    Allergic/Immunologic: Negative.    Neurological: Negative.    Hematological: Negative.    Psychiatric/Behavioral: Negative.        I have reviewed and agree with the HPI, ROS, and historical information as entered above. China Dorman MD    Objective   /78 (BP Location: Right arm, Patient Position: Sitting, Cuff Size: Adult)   Ht 157.5 cm (62\")   Wt 61.9 kg (136 lb 6.4 oz)   LMP 05/09/2021 (Exact Date)   Breastfeeding No   BMI 24.95 kg/m²     Physical Exam  Vitals and nursing note reviewed.   Constitutional:       Appearance: Normal appearance.   HENT:      Head: Normocephalic and atraumatic.   Eyes:      General: No scleral icterus.     Pupils: Pupils are equal, round, and reactive to light.   Pulmonary:      Effort: Pulmonary effort is normal.      Breath sounds: Normal breath sounds.   Abdominal:      General: Bowel sounds are normal. There is no distension.      Palpations: Abdomen is soft.      Tenderness: There is no abdominal tenderness. "   Musculoskeletal:         General: Normal range of motion.      Cervical back: Normal range of motion and neck supple.      Right lower leg: No edema.      Left lower leg: No edema.   Skin:     General: Skin is warm and dry.   Neurological:      General: No focal deficit present.      Mental Status: She is alert and oriented to person, place, and time.   Psychiatric:         Mood and Affect: Mood normal.         Behavior: Behavior normal. Behavior is cooperative.         Assessment/Plan     Assessment     Problem List Items Addressed This Visit     Pelvic pain    Overview     Ongoing pelvic pain and dyspareunia x3 years.  She has known endometriosis and history of  x2.  She does not tolerate hormonal contraception well.  She failed MOIZ and Mirena IUD.  Her  has a vasectomy.            Other Visit Diagnoses     Dysmenorrhea    -  Primary            Plan     No follow-ups on file.  1. persistant pelvic pain, dysmenorrhea, not improved with homronal intervention. Will proceed with dx lap possible laser.       China Dorman MD  2021

## 2021-06-03 ENCOUNTER — OFFICE VISIT (OUTPATIENT)
Dept: OBSTETRICS AND GYNECOLOGY | Facility: CLINIC | Age: 36
End: 2021-06-03

## 2021-06-03 VITALS
SYSTOLIC BLOOD PRESSURE: 136 MMHG | BODY MASS INDEX: 25.4 KG/M2 | DIASTOLIC BLOOD PRESSURE: 80 MMHG | HEIGHT: 62 IN | WEIGHT: 138 LBS

## 2021-06-03 DIAGNOSIS — R10.2 PELVIC PAIN: ICD-10-CM

## 2021-06-03 DIAGNOSIS — N94.6 DYSMENORRHEA: Primary | ICD-10-CM

## 2021-06-03 PROCEDURE — 99213 OFFICE O/P EST LOW 20 MIN: CPT | Performed by: OBSTETRICS & GYNECOLOGY

## 2021-06-03 NOTE — PROGRESS NOTES
Subjective   Demetria Perera is a 35 y.o. year old  who is scheduled  for surgery due to pelvic pain.  She is scheduled for a diagnostic laparoscopy  at [facility].  Her surgery is scheduled for  at 11am.  Her birth control method is vasectomy,  Her BMI is 25.          She has reviewed the informational pamphlet on diagnostic laparoscopy.    She understands the risks of bleeding, infection, possible damage to other organ systems, including but not limited to the gastrointestinal tract and genitourinary tract.  She also understands the specific risks listed in the preop information (video, pamphlets, etc.).    She has reviewed and signed the preop consent form.    She has been instructed to have a light dinner the night before surgery, then nothing to eat or drink after midnight.  The day of surgery do not chew gum or smoke.  Remove all jewelry, nail polish, contact lenses prior to coming to the hospital.  Do not bring valuables or large sums of money with you. Patient was instructed on what time to arrive and where to check in, maps were given.  She was instructed that she will meet an Anesthesiologist and that an IV will be started to provide fluids and sedation.  The total time of procedure was discussed.  She was instructed that she will need a .      She has confirmed that she IS allergic to Latex.       Past Medical History:   Diagnosis Date   • Fibroadenoma of breast, right    • History of abnormal cervical Papanicolaou smear    • Heart palpitations 2021    abnormal holter and EKG; baseline echo normal   • Asthma    • Bilateral ovarian cysts    • Dyspareunia, female    • Environmental allergies    • Fibrocystic breast changes    • Gluten free diet    • History of cold sores    • History of pregnancy induced hypertension    • Menorrhagia    • Myofascial pain     pelvic, saw PT until 2020     Past Surgical History:   Procedure Laterality Date   • BREAST  LUMPECTOMY Right     for fibroadenoma, quarter-sized; performed by Dr. Saba.   • BREAST BIOPSY     • COLPOSCOPY W/ BIOPSY / CURETTAGE  2009    moderate dysplasia   • LEEP  2009   • GUM SURGERY  2020    graft   •  SECTION     • WISDOM TOOTH EXTRACTION       OB History    Para Term  AB Living   3 2 2 0 1 2   SAB TAB Ectopic Molar Multiple Live Births   1 0 0 0 0 2      # Outcome Date GA Lbr Faizan/2nd Weight Sex Delivery Anes PTL Lv   3 Term 14 38w0d  3657 g (8 lb 1 oz) M CS-LTranv   BISHNU   2 Term 12 38w0d  3402 g (7 lb 8 oz) M CS-LTranv   BISHNU      Complications: Failure to progress in labor   1 SAB              Social History     Tobacco Use   Smoking Status Never Smoker   Smokeless Tobacco Never Used     Social History     Substance and Sexual Activity   Alcohol Use Yes    Comment: 0-1x weekly     Social History     Substance and Sexual Activity   Drug Use Never     Prior to Admission medications    Medication Sig Start Date End Date Taking? Authorizing Provider   ALLERGY SERUM SUBLINGUAL DROPS Place  under the tongue 1 (One) Time.    Provider, MD Joey   cetirizine (zyrTEC) 10 MG tablet Take 10 mg by mouth Daily.    Emergency, Nurse Nader RN   clindamycin (CLEOCIN T) 1 % lotion Apply  topically to the appropriate area as directed 2 (Two) Times a Day.    Emergency, Nurse Nader RN   FIBER PO Take  by mouth.    Emergency, Nurse Nader RN   fluticasone (FLONASE) 50 MCG/ACT nasal spray 2 sprays into the nostril(s) as directed by provider Daily.    Nidhi, Nurse Nader RN   Levocetirizine Dihydrochloride (XYZAL PO) Take  by mouth.    Provider, MD Joey   montelukast (SINGULAIR) 10 MG tablet Take 10 mg by mouth every night.    Nurse Nader Terrell RN   pantoprazole (PROTONIX) 40 MG EC tablet Take 1 tablet by mouth Every Morning. Take on an EMPTY STOMACH 30 min to 1hr BEFORE the first meal of the day 20   Prabhakar Mccurdy MD   prednisoLONE acetate  "(PRED FORTE) 1 % ophthalmic suspension  4/12/20   Provider, MD Joey   Probiotic Product (PROBIOTIC DAILY PO) Take  by mouth.    Emergency, Nurse Nader, RN   valACYclovir (VALTREX) 1000 MG tablet  4/3/21   Provider, MD Joey     Allergies   Allergen Reactions   • Shellfish-Derived Products Anaphylaxis   • Sulfamethoxazole-Trimethoprim Anaphylaxis   • Latex Swelling     ulcers   • Nuts Headache   • Barley Grass Diarrhea and Nausea Only   • Buckwheat Diarrhea, Nausea And Vomiting and GI Intolerance   • Penicillins Hives and Rash       Review of Systems      Objective   /80   Ht 157.5 cm (62\")   Wt 62.6 kg (138 lb)   LMP 05/09/2021 (Exact Date)   Breastfeeding No   BMI 25.24 kg/m²   General: well developed; well nourished  no acute distress   Heart: Not performed.   Lungs: breathing is unlabored   Abdomen: soft, non-tender; no masses  no umbilical or inguinal hernias are present  no hepato-splenomegaly   Pelvis: Clinical staff was present for exam        Assessment   Problems Addressed this Visit     Pelvic pain      Other Visit Diagnoses     Dysmenorrhea    -  Primary      Diagnoses       Codes Comments    Dysmenorrhea    -  Primary ICD-10-CM: N94.6  ICD-9-CM: 625.3     Pelvic pain     ICD-10-CM: R10.2  ICD-9-CM: [id number]                Plan   1. Pelvic pain and dysmenorrhea, plan dx lap with possible laser.   2. Risks of surgery were reviewed with the patient including risks of bleeding, infection, damage to other organ systems including, but not limited to GI and  tracts (bowel, bladder, blood vessels, nerves) risks of Anesthesia, as well as the risk the surgery will not produce the desired results, possible need for additional surgery, death, risk of uterine perforation.  3. Danie has been obtained and reviewed   4. Pain Medication Consent Form has been signed.  A review regarding proper medication administration, impact on driving and working while medicated, the safety of use in pregnancy, " the potential for overdose and the proper disposal and storage of controlled medications has been done with the patient.          China Dorman MD  6/3/2021

## 2021-06-04 LAB
ERYTHROCYTE [DISTWIDTH] IN BLOOD BY AUTOMATED COUNT: 12.5 % (ref 12.3–15.4)
HCG INTACT+B SERPL-ACNC: <0.5 MIU/ML
HCT VFR BLD AUTO: 40 % (ref 34–46.6)
HGB BLD-MCNC: 14.2 G/DL (ref 12–15.9)
MCH RBC QN AUTO: 32.2 PG (ref 26.6–33)
MCHC RBC AUTO-ENTMCNC: 35.5 G/DL (ref 31.5–35.7)
MCV RBC AUTO: 90.7 FL (ref 79–97)
PLATELET # BLD AUTO: 229 10*3/MM3 (ref 140–450)
RBC # BLD AUTO: 4.41 10*6/MM3 (ref 3.77–5.28)
WBC # BLD AUTO: 5.11 10*3/MM3 (ref 3.4–10.8)

## 2021-06-07 ENCOUNTER — TELEPHONE (OUTPATIENT)
Dept: OBSTETRICS AND GYNECOLOGY | Facility: CLINIC | Age: 36
End: 2021-06-07

## 2021-06-07 NOTE — TELEPHONE ENCOUNTER
Patient had her covid test done in [employer] at [facility] and her results are in letter form and they don't specifically state it's a PCR test. She wants to know if this is sufficient. She is also asking if it's okay that she has shellac/gel polish on her nails - states she can get it removed today if she needs to. Surgery scheduled for tomorrow.

## 2021-06-07 NOTE — TELEPHONE ENCOUNTER
Spoke with [facility] and PACU supervisor Cristina said as long as letter has pt's name, where it was done, test date and results on it then it would be fine. Gel polish is fine.

## 2021-06-08 ENCOUNTER — OUTSIDE FACILITY SERVICE (OUTPATIENT)
Dept: OBSTETRICS AND GYNECOLOGY | Facility: CLINIC | Age: 36
End: 2021-06-08

## 2021-06-08 DIAGNOSIS — N94.6 DYSMENORRHEA: Primary | ICD-10-CM

## 2021-06-08 PROCEDURE — 49329 UNLSTD LAPS PX ABD PERTM&OMN: CPT | Performed by: OBSTETRICS & GYNECOLOGY

## 2021-06-08 RX ORDER — HYDROCODONE BITARTRATE AND ACETAMINOPHEN 5; 325 MG/1; MG/1
1 TABLET ORAL EVERY 6 HOURS PRN
Qty: 12 TABLET | Refills: 0 | Status: SHIPPED | OUTPATIENT
Start: 2021-06-08 | End: 2022-05-24

## 2021-06-08 RX ORDER — OXYCODONE HYDROCHLORIDE AND ACETAMINOPHEN 5; 325 MG/1; MG/1
1 TABLET ORAL EVERY 4 HOURS PRN
Qty: 18 TABLET | Refills: 0 | Status: SHIPPED | OUTPATIENT
Start: 2021-06-08 | End: 2021-06-08 | Stop reason: ALTCHOICE

## 2021-06-08 RX ORDER — IBUPROFEN 600 MG/1
600 TABLET ORAL EVERY 6 HOURS PRN
Qty: 20 TABLET | Refills: 0 | Status: SHIPPED | OUTPATIENT
Start: 2021-06-08 | End: 2022-05-24

## 2021-06-11 ENCOUNTER — TELEPHONE (OUTPATIENT)
Dept: OBSTETRICS AND GYNECOLOGY | Facility: CLINIC | Age: 36
End: 2021-06-11

## 2021-06-11 NOTE — TELEPHONE ENCOUNTER
Diagnostic lap on Tuesday and patient is having severe chest pain and burning - she cannot take a deep breath and is having a lot of deep barking coughing. Painful coughing when she tries to lay down. Pain from surgery itself has subsided but she is concerned that something settled within her chest.

## 2021-06-11 NOTE — TELEPHONE ENCOUNTER
Recommended she go to the ER to rule out a PE given her sx. Pt. States she feels that is a bit over the top as it could be gas. Informed that it could be gas but we could not say that without a proper work up for a PE. Informed I could discuss with KS if she is here today or the on call MD. KS is out, discussed with LOS she agreed that this needs to be something that is worked up through the ER. She is welcome to try [facility] but they would likely say the same thing. She VU

## 2021-06-24 ENCOUNTER — OFFICE VISIT (OUTPATIENT)
Dept: OBSTETRICS AND GYNECOLOGY | Facility: CLINIC | Age: 36
End: 2021-06-24

## 2021-06-24 DIAGNOSIS — R10.2 PELVIC PAIN: Primary | ICD-10-CM

## 2021-06-24 PROCEDURE — 99212 OFFICE O/P EST SF 10 MIN: CPT | Performed by: OBSTETRICS & GYNECOLOGY

## 2021-06-24 RX ORDER — AZELASTINE 1 MG/ML
SPRAY, METERED NASAL
COMMUNITY
Start: 2021-05-13

## 2021-06-24 RX ORDER — LIDOCAINE HYDROCHLORIDE 20 MG/ML
5 SOLUTION OROPHARYNGEAL
COMMUNITY
Start: 2021-06-16

## 2021-06-24 RX ORDER — BENZONATATE 100 MG/1
100 CAPSULE ORAL
COMMUNITY
Start: 2021-06-11 | End: 2021-10-21

## 2021-06-24 NOTE — PROGRESS NOTES
Post-Op Visit    Chief Complaint   Patient presents with   • Post-op       HPI  Demetria Perera is a 36 y.o. female who returns for a post-operative follow-up visit after undergoing laparoscopy for pelvic pain on 06/08/2021.      Since the patient was last seen, she reports pain is well controlled, right sided lower abdominal pain and wound redness. She also reports she is still having throat pain- reports sometimes it is severe, coughing, trouble catching a full breath- she also has asthma and PCP said it could return to normal once she is cleared for activity, numbness on the outside of her left leg, and increase in vaginal discharge.    She states since the procedure, she has shown improvement in their activity level.    The additional following portions of the patient's history were reviewed and updated as appropriate: allergies, current medications, past family history, past medical history, past social history and past surgical history.    Review of Systems   Constitutional: Negative.    HENT: Positive for sore throat.    Eyes: Negative.    Respiratory: Positive for cough.    Cardiovascular: Negative.    Gastrointestinal: Positive for abdominal pain.   Endocrine: Negative.    Genitourinary: Positive for vaginal discharge.   Musculoskeletal: Negative.    Skin: Negative.    Allergic/Immunologic: Negative.    Neurological: Positive for numbness (left leg).   Hematological: Negative.    Psychiatric/Behavioral: Negative.        I have reviewed and agree with the HPI, ROS, and historical information as entered above. China Dorman MD    Objective   There were no vitals taken for this visit.    Physical Exam  Vitals and nursing note reviewed.   Constitutional:       Appearance: Normal appearance.   HENT:      Head: Normocephalic and atraumatic.   Eyes:      General: No scleral icterus.     Pupils: Pupils are equal, round, and reactive to light.   Pulmonary:      Effort: Pulmonary effort is normal.      Breath sounds:  Normal breath sounds.   Abdominal:      General: Bowel sounds are normal. There is no distension.      Palpations: Abdomen is soft.      Tenderness: There is no abdominal tenderness.   Musculoskeletal:         General: Normal range of motion.      Cervical back: Normal range of motion and neck supple.      Right lower leg: No edema.      Left lower leg: No edema.   Skin:     General: Skin is warm and dry.   Neurological:      General: No focal deficit present.      Mental Status: She is alert and oriented to person, place, and time.   Psychiatric:         Mood and Affect: Mood normal.         Behavior: Behavior normal. Behavior is cooperative.         Assessment/Plan     Assessment     Problem List Items Addressed This Visit     Pelvic pain - Primary    Overview     Ongoing pelvic pain and dyspareunia x3 years.  She has history of  x2.  She does not tolerate hormonal contraception well.  She failed MOIZ and Mirena IUD.  Her  has a vasectomy.   No endo on dx lap - +adhesions                 Plan:  No orders of the defined types were placed in this encounter.      Instructions:  1. The patient has done well after surgery with no apparent complications. I have discussed the postoperative course to date, as well as, the expected progress going forward.  The patient understands what complications to be concerned about. I will see the patient in routine follow-up, or sooner if needed.   2. She reports her pain is improved with lysis of adhesions      China Dorman MD

## 2021-09-14 PROBLEM — K21.9 GERD (GASTROESOPHAGEAL REFLUX DISEASE): Status: ACTIVE | Noted: 2021-05-07

## 2021-09-14 PROBLEM — J45.909 ASTHMA: Status: ACTIVE | Noted: 2021-09-14

## 2021-09-14 PROBLEM — I49.1 APC (ATRIAL PREMATURE CONTRACTIONS): Status: ACTIVE | Noted: 2021-05-07

## 2021-09-14 PROBLEM — R00.2 PALPITATIONS: Status: ACTIVE | Noted: 2021-05-07

## 2021-09-14 PROBLEM — J30.89 ENVIRONMENTAL AND SEASONAL ALLERGIES: Status: ACTIVE | Noted: 2021-05-07

## 2021-09-14 PROBLEM — I49.3 PVC'S (PREMATURE VENTRICULAR CONTRACTIONS): Status: ACTIVE | Noted: 2021-05-07

## 2021-09-14 PROBLEM — Z78.9 CAFFEINE USE: Status: ACTIVE | Noted: 2021-05-07

## 2021-09-14 PROBLEM — R94.31 HOLTER MONITOR, ABNORMAL: Status: ACTIVE | Noted: 2021-05-07

## 2021-09-16 ENCOUNTER — OFFICE VISIT (OUTPATIENT)
Dept: GASTROENTEROLOGY | Facility: CLINIC | Age: 36
End: 2021-09-16

## 2021-09-16 VITALS
TEMPERATURE: 98 F | DIASTOLIC BLOOD PRESSURE: 78 MMHG | HEIGHT: 62 IN | SYSTOLIC BLOOD PRESSURE: 118 MMHG | OXYGEN SATURATION: 98 % | BODY MASS INDEX: 25.95 KG/M2 | WEIGHT: 141 LBS | HEART RATE: 82 BPM

## 2021-09-16 DIAGNOSIS — R10.11 RIGHT UPPER QUADRANT ABDOMINAL PAIN: Primary | ICD-10-CM

## 2021-09-16 PROCEDURE — 99213 OFFICE O/P EST LOW 20 MIN: CPT | Performed by: INTERNAL MEDICINE

## 2021-09-16 RX ORDER — PHENAZOPYRIDINE HYDROCHLORIDE 100 MG/1
100 TABLET, FILM COATED ORAL 3 TIMES DAILY PRN
COMMUNITY
Start: 2021-09-14 | End: 2022-05-24

## 2021-09-16 RX ORDER — EPINEPHRINE 0.15 MG/.3ML
0.15 INJECTION INTRAMUSCULAR
COMMUNITY

## 2021-09-16 RX ORDER — NITROFURANTOIN 25; 75 MG/1; MG/1
100 CAPSULE ORAL 2 TIMES DAILY
COMMUNITY
Start: 2021-09-14 | End: 2021-10-21

## 2021-09-16 NOTE — PROGRESS NOTES
Patient Name: Demetria Perera  YOB: 1985   Medical Record number: [id number]     PCP: An Aguila    Chief Complaint   Patient presents with   • Follow-up     6 month   Follow-up for right upper abdominal discomfort and lower pelvic discomfort.    History of Present Illness:   HPI  Mrs. Perera returns to the office for follow-up.  Patient had a laparoscopy in  and there were significant adhesions to the uterus and abdominal wall.  The ovaries were within normal limits.  There was no direct evidence for endometriosis.  Mrs. Perera states she does not have the right upper abdominal catch type discomfort most days.  There may be an associated times with alcohol intake.  She denies any nausea or vomiting.  Her appetite overall is good.  Mrs. Perera denies any weight loss.  Past Medical History:   Diagnosis Date   • Asthma    • Bilateral ovarian cysts    • Dyspareunia, female    • Environmental allergies    • Fibroadenoma of breast, right    • Fibrocystic breast changes    • Gluten free diet    • Heart palpitations 2021    abnormal holter and EKG; baseline echo normal   • History of abnormal cervical Papanicolaou smear    • History of cold sores    • History of pregnancy induced hypertension    • Menorrhagia    • Myofascial pain     pelvic, saw PT until 2020   • UTI (urinary tract infection)        Past Surgical History:   Procedure Laterality Date   • BREAST BIOPSY     • BREAST LUMPECTOMY Right     for fibroadenoma, quarter-sized; performed by Dr. Saba.   •  SECTION     • COLPOSCOPY W/ BIOPSY / CURETTAGE  2009    moderate dysplasia   • GUM SURGERY  2020    graft   • LEEP  2009   • WISDOM TOOTH EXTRACTION           Current Outpatient Medications:   •  albuterol (PROAIR RESPICLICK) 108 (90 Base) MCG/ACT inhaler, Inhale 2 puffs., Disp: , Rfl:   •  ALLERGY SERUM SUBLINGUAL DROPS, Place  under the tongue 1 (One) Time., Disp: , Rfl:   •   azelastine (ASTELIN) 0.1 % nasal spray, , Disp: , Rfl:   •  cetirizine (zyrTEC) 10 MG tablet, Take 10 mg by mouth Daily., Disp: , Rfl:   •  clindamycin (CLEOCIN T) 1 % lotion, Apply  topically to the appropriate area as directed 2 (Two) Times a Day., Disp: , Rfl:   •  FIBER PO, Take  by mouth., Disp: , Rfl:   •  fluticasone (FLONASE) 50 MCG/ACT nasal spray, 2 sprays into the nostril(s) as directed by provider Daily., Disp: , Rfl:   •  ibuprofen (ADVIL,MOTRIN) 600 MG tablet, Take 1 tablet by mouth Every 6 (Six) Hours As Needed for Mild Pain ., Disp: 20 tablet, Rfl: 0  •  montelukast (SINGULAIR) 10 MG tablet, Take 10 mg by mouth every night., Disp: , Rfl:   •  nitrofurantoin, macrocrystal-monohydrate, (MACROBID) 100 MG capsule, Take 100 mg by mouth 2 (Two) Times a Day., Disp: , Rfl:   •  phenazopyridine (PYRIDIUM) 100 MG tablet, Take 100 mg by mouth 3 (Three) Times a Day As Needed., Disp: , Rfl:   •  prednisoLONE acetate (PRED FORTE) 1 % ophthalmic suspension, As Needed., Disp: , Rfl:   •  Probiotic Product (PROBIOTIC DAILY PO), Take  by mouth., Disp: , Rfl:   •  valACYclovir (VALTREX) 1000 MG tablet, As Needed., Disp: , Rfl:   •  benzonatate (TESSALON) 100 MG capsule, Take 100 mg by mouth., Disp: , Rfl:   •  EPINEPHrine (EPIPEN JR) 0.15 MG/0.3ML solution auto-injector injection, Inject 0.15 mg into the appropriate muscle as directed by prescriber., Disp: , Rfl:   •  HYDROcodone-acetaminophen (NORCO) 5-325 MG per tablet, Take 1 tablet by mouth Every 6 (Six) Hours As Needed for Moderate Pain ., Disp: 12 tablet, Rfl: 0  •  Levocetirizine Dihydrochloride (XYZAL PO), Take  by mouth., Disp: , Rfl:   •  Lidocaine Viscous HCl (XYLOCAINE) 2 % solution, Take 5 mL by mouth., Disp: , Rfl:   •  pantoprazole (PROTONIX) 40 MG EC tablet, Take 1 tablet by mouth Every Morning. Take on an EMPTY STOMACH 30 min to 1hr BEFORE the first meal of the day, Disp: 30 tablet, Rfl: 4    Allergies   Allergen Reactions   • Bactrim  [Sulfamethoxazole-Trimethoprim] Anaphylaxis   • Latex, Natural Rubber Anaphylaxis   • Shellfish-Derived Products Anaphylaxis   • Latex Swelling     ulcers   • Nuts Headache   • Oxycodone-Acetaminophen Provider Review Needed   • Barley Grass Diarrhea and Nausea Only   • Buckwheat Diarrhea, Nausea And Vomiting and GI Intolerance   • Penicillins Hives and Rash       Family History   Problem Relation Age of Onset   • Arthritis Mother    • Heart disease Mother    • Hyperlipidemia Father    • Gout Father    • Heart disease Father    • Hypertension Maternal Grandmother    • Diabetes Maternal Grandmother    • Heart failure Maternal Grandmother    • Hypertension Maternal Grandfather    • Heart failure Maternal Grandfather    • Macular degeneration Maternal Grandfather    • Hypertension Paternal Grandmother    • Hyperlipidemia Paternal Grandmother    • Aneurysm Paternal Grandmother    • Hypertension Paternal Grandfather    • Heart disease Paternal Grandfather         pacemaker   • Stroke Paternal Grandfather    • Breast cancer Maternal Great-Grandmother        Social History     Socioeconomic History   • Marital status:      Spouse name: Not on file   • Number of children: Not on file   • Years of education: Not on file   • Highest education level: Not on file   Tobacco Use   • Smoking status: Never Smoker   • Smokeless tobacco: Never Used   Vaping Use   • Vaping Use: Never used   Substance and Sexual Activity   • Alcohol use: Yes     Comment: 0-1x weekly   • Drug use: Never   • Sexual activity: Yes     Partners: Male     Birth control/protection: Partner's vasectomy     Comment: Vasectomy       Review of Systems   Constitutional: Negative for activity change, appetite change, fatigue, fever and unexpected weight change.   HENT: Negative for dental problem, hearing loss, mouth sores, postnasal drip, sneezing, trouble swallowing and voice change.    Eyes: Negative for pain, redness, itching and visual disturbance.    Respiratory: Negative for cough, choking, chest tightness, shortness of breath and wheezing.    Cardiovascular: Negative for chest pain, palpitations and leg swelling.   Gastrointestinal: Positive for abdominal distention (bloating) and abdominal pain. Negative for anal bleeding, blood in stool, constipation, diarrhea, nausea, rectal pain and vomiting.   Endocrine: Negative for cold intolerance, heat intolerance, polydipsia, polyphagia and polyuria.   Genitourinary: Negative.  Negative for dysuria, enuresis, flank pain, hematuria and urgency.   Musculoskeletal: Negative for arthralgias, back pain, gait problem, joint swelling and myalgias.   Skin: Negative for color change, pallor and rash.   Allergic/Immunologic: Positive for food allergies (shellfish). Negative for environmental allergies and immunocompromised state.   Neurological: Negative for dizziness, tremors, seizures, facial asymmetry, speech difficulty, numbness and headaches.   Hematological: Negative for adenopathy.   Psychiatric/Behavioral: Negative for behavioral problems, confusion, dysphoric mood, hallucinations and self-injury.       Vitals:    09/16/21 1348   BP: 118/78   Pulse: 82   Temp: 98 °F (36.7 °C)   SpO2: 98%       Physical Exam  Vitals reviewed.   Constitutional:       Appearance: Normal appearance.   HENT:      Head: Normocephalic and atraumatic.      Nose: Nose normal.      Mouth/Throat:      Mouth: Mucous membranes are moist.      Pharynx: Oropharynx is clear.   Eyes:      General: No scleral icterus.     Extraocular Movements: Extraocular movements intact.   Cardiovascular:      Rate and Rhythm: Normal rate and regular rhythm.      Heart sounds: No murmur heard.   No gallop.    Pulmonary:      Breath sounds: Normal breath sounds. No wheezing or rales.   Abdominal:      Palpations: Abdomen is soft.      Tenderness: There is no abdominal tenderness. There is no guarding.   Musculoskeletal:         General: No swelling. Normal range of  motion.      Cervical back: Normal range of motion and neck supple.   Skin:     General: Skin is dry.      Coloration: Skin is not jaundiced.   Neurological:      Mental Status: She is alert and oriented to person, place, and time.   Psychiatric:         Mood and Affect: Mood normal.         Thought Content: Thought content normal.         Judgment: Judgment normal.         Diagnoses and all orders for this visit:    1. Right upper quadrant abdominal pain (Primary)    The patient has undergone an ultrasound and HIDA scan that were unremarkable for a gallbladder etiology at this time.  Discussed the possible pursuit of an upper endoscopy for luminal assessment of the upper GI tract.  The laparoscopy did not demonstrate any obvious bowel issues but there were significant adhesions involving the abdominal wall and uterus.      Plan: The patient will consider EGD for assessment of the upper GI tract.

## 2021-09-20 DIAGNOSIS — Z01.818 PRE-OP TESTING: Primary | ICD-10-CM

## 2021-10-14 ENCOUNTER — OUTSIDE FACILITY SERVICE (OUTPATIENT)
Dept: GASTROENTEROLOGY | Facility: CLINIC | Age: 36
End: 2021-10-14

## 2021-10-14 ENCOUNTER — LAB REQUISITION (OUTPATIENT)
Dept: LAB | Facility: HOSPITAL | Age: 36
End: 2021-10-14

## 2021-10-14 DIAGNOSIS — R10.11 RIGHT UPPER QUADRANT PAIN: ICD-10-CM

## 2021-10-14 DIAGNOSIS — K21.00 GASTRO-ESOPHAGEAL REFLUX DISEASE WITH ESOPHAGITIS, WITHOUT BLEEDING: ICD-10-CM

## 2021-10-14 DIAGNOSIS — K44.9 DIAPHRAGMATIC HERNIA WITHOUT OBSTRUCTION OR GANGRENE: ICD-10-CM

## 2021-10-14 DIAGNOSIS — R11.0 NAUSEA: ICD-10-CM

## 2021-10-14 PROCEDURE — 43239 EGD BIOPSY SINGLE/MULTIPLE: CPT | Performed by: INTERNAL MEDICINE

## 2021-10-14 PROCEDURE — 88305 TISSUE EXAM BY PATHOLOGIST: CPT | Performed by: INTERNAL MEDICINE

## 2021-10-15 LAB
CYTO UR: NORMAL
LAB AP CASE REPORT: NORMAL
LAB AP CLINICAL INFORMATION: NORMAL
PATH REPORT.FINAL DX SPEC: NORMAL
PATH REPORT.GROSS SPEC: NORMAL

## 2021-10-18 ENCOUNTER — TELEPHONE (OUTPATIENT)
Dept: GASTROENTEROLOGY | Facility: CLINIC | Age: 36
End: 2021-10-18

## 2021-10-18 DIAGNOSIS — K21.00 GASTROESOPHAGEAL REFLUX DISEASE WITH ESOPHAGITIS WITHOUT HEMORRHAGE: Primary | ICD-10-CM

## 2021-10-18 RX ORDER — ESOMEPRAZOLE MAGNESIUM 40 MG/1
40 CAPSULE, DELAYED RELEASE ORAL DAILY
Qty: 90 CAPSULE | Refills: 0 | Status: SHIPPED | OUTPATIENT
Start: 2021-10-18 | End: 2022-01-16

## 2021-10-18 NOTE — TELEPHONE ENCOUNTER
I spoke with Ms Perera. Biopsy results given. Patient stated that she isn't no longer taking Pantoprazole 40 mg once daily.. I informed her that we can send in Nexium 40 mg once daily. Patient voiced understanding.

## 2021-10-21 ENCOUNTER — OFFICE VISIT (OUTPATIENT)
Dept: OBSTETRICS AND GYNECOLOGY | Facility: CLINIC | Age: 36
End: 2021-10-21

## 2021-10-21 VITALS
WEIGHT: 138.2 LBS | BODY MASS INDEX: 25.43 KG/M2 | SYSTOLIC BLOOD PRESSURE: 120 MMHG | DIASTOLIC BLOOD PRESSURE: 80 MMHG | HEIGHT: 62 IN

## 2021-10-21 DIAGNOSIS — N63.14 BREAST LUMP ON RIGHT SIDE AT 4 O'CLOCK POSITION: Primary | ICD-10-CM

## 2021-10-21 DIAGNOSIS — N64.4 BREAST PAIN: ICD-10-CM

## 2021-10-21 PROCEDURE — 99213 OFFICE O/P EST LOW 20 MIN: CPT | Performed by: NURSE PRACTITIONER

## 2021-10-21 NOTE — PROGRESS NOTES
Chief Complaint   Patient presents with   • Breast Problem       Subjective     HPI  Demetria Perera is a 36 y.o. female, , who presents with breast complaints of breast tenderness, pruritus and rash.  This is present in bilateral breast(s). Patient had a mass removed on right breast in . Patient stated that her breast tenderness is constant when it is happeneing. Patient stated that her breast pain has been with and without a menstrual cycle. Patient stated that she has had breast ultrasounds before. Patient stated that she has a rash that is itchy that is present under both arm pits. Patient stated that then rash is patchy, the rash is not covering her entire arm pit. Patient denies any change in deodorant or lotions. Patient stated that the rash happened before she went on her [place] trip in September.     She states she has experienced this problem for 3 months.  She describes the severity as severe to moderate.  She states that the problem has not changed since she discovered it. The patient denies breast lump, dryness and nipple discharge. She has not had a mammogram before. She does not have a family history of breast cancer.    Her last LMP was 10/19/2021 .  Periods are regular every 28-30 days.    Current contraception: contraceptive methods: None    Last mammogram:   Last Completed Mammogram     This patient has no relevant Health Maintenance data.        Tobacco Usage?: No     OB History        3    Para   2    Term   2       0    AB   1    Living   2       SAB   1    IAB        Ectopic        Molar        Multiple        Live Births   2                Past Medical History:   Diagnosis Date   • Asthma    • Bilateral ovarian cysts    • Dyspareunia, female    • Environmental allergies    • Fibroadenoma of breast, right    • Fibrocystic breast changes    • Gluten free diet    • Heart palpitations 2021    abnormal holter and EKG; baseline echo normal   • History of  "abnormal cervical Papanicolaou smear    • History of cold sores    • History of pregnancy induced hypertension    • Menorrhagia    • Myofascial pain     pelvic, saw PT until 2020   • UTI (urinary tract infection)        Past Surgical History:   Procedure Laterality Date   • BREAST BIOPSY     • BREAST LUMPECTOMY Right     for fibroadenoma, quarter-sized; performed by Dr. Saba.   •  SECTION     • COLPOSCOPY W/ BIOPSY / CURETTAGE  2009    moderate dysplasia   • GUM SURGERY  2020    graft   • LEEP  2009   • WISDOM TOOTH EXTRACTION         Family History   Problem Relation Age of Onset   • Arthritis Mother    • Heart disease Mother    • Hyperlipidemia Father    • Gout Father    • Heart disease Father    • Hypertension Maternal Grandmother    • Diabetes Maternal Grandmother    • Heart failure Maternal Grandmother    • Hypertension Maternal Grandfather    • Heart failure Maternal Grandfather    • Macular degeneration Maternal Grandfather    • Hypertension Paternal Grandmother    • Hyperlipidemia Paternal Grandmother    • Aneurysm Paternal Grandmother    • Hypertension Paternal Grandfather    • Heart disease Paternal Grandfather         pacemaker   • Stroke Paternal Grandfather    • Breast cancer Maternal Great-Grandmother           Review of Systems    I have reviewed and agree with the HPI, ROS, and historical information as entered above. Mary Batista, APRN    Objective   /80   Ht 157.5 cm (62\")   Wt 62.7 kg (138 lb 3.2 oz)   LMP 10/19/2021   Breastfeeding No   BMI 25.28 kg/m²     Physical Exam  Constitutional:       Appearance: Normal appearance.   Pulmonary:      Effort: Pulmonary effort is normal.   Chest:   Breasts:      Right: Mass present.        Comments: Right breast lump at 4:00, 2 cm from areola, 1 cm in size, firm, mobile, tender  Neurological:      Mental Status: She is alert.           Assessment/Plan     Assessment     Problem List Items Addressed This " Visit     None      Visit Diagnoses     Breast lump on right side at 4 o'clock position    -  Primary    Relevant Orders    Mammo Diagnostic Digital Tomosynthesis Bilateral With CAD    Breast pain        Relevant Orders    Mammo Diagnostic Digital Tomosynthesis Bilateral With CAD        History of fibroadenoma of the right breast  Faint rash under bilateral axilla, improved with hydrocortisone. She is starting an oral steroid for her neck and back pain tomorrow. If this persists she will follow-up with dermatology.    Plan   1.  Discussed fibrocystic disease and alleviating measures.  2. Arranged for mammogram.  Return for Annual physical.      Mary Batista, APRN  10/21/2021

## 2021-10-25 ENCOUNTER — TELEPHONE (OUTPATIENT)
Dept: OBSTETRICS AND GYNECOLOGY | Facility: CLINIC | Age: 36
End: 2021-10-25

## 2021-11-05 ENCOUNTER — HOSPITAL ENCOUNTER (OUTPATIENT)
Dept: MAMMOGRAPHY | Facility: HOSPITAL | Age: 36
Discharge: HOME | End: 2021-11-05

## 2021-11-05 ENCOUNTER — HOSPITAL ENCOUNTER (OUTPATIENT)
Dept: ULTRASOUND IMAGING | Facility: HOSPITAL | Age: 36
Discharge: HOME | End: 2021-11-05

## 2021-11-05 DIAGNOSIS — N64.4 BREAST PAIN: ICD-10-CM

## 2021-11-05 DIAGNOSIS — N63.14 BREAST LUMP ON RIGHT SIDE AT 4 O'CLOCK POSITION: ICD-10-CM

## 2021-11-05 PROCEDURE — 77066 DX MAMMO INCL CAD BI: CPT

## 2021-11-05 PROCEDURE — 76642 ULTRASOUND BREAST LIMITED: CPT

## 2021-11-05 PROCEDURE — G0279 TOMOSYNTHESIS, MAMMO: HCPCS

## 2022-05-24 ENCOUNTER — OFFICE VISIT (OUTPATIENT)
Dept: OBSTETRICS AND GYNECOLOGY | Facility: CLINIC | Age: 37
End: 2022-05-24

## 2022-05-24 VITALS
SYSTOLIC BLOOD PRESSURE: 110 MMHG | HEIGHT: 62 IN | WEIGHT: 143 LBS | DIASTOLIC BLOOD PRESSURE: 70 MMHG | BODY MASS INDEX: 26.31 KG/M2

## 2022-05-24 DIAGNOSIS — Z01.419 WOMEN'S ANNUAL ROUTINE GYNECOLOGICAL EXAMINATION: Primary | ICD-10-CM

## 2022-05-24 PROBLEM — Z87.42 H/O ABNORMAL CERVICAL PAPANICOLAOU SMEAR: Status: ACTIVE | Noted: 2022-05-24

## 2022-05-24 PROCEDURE — 99395 PREV VISIT EST AGE 18-39: CPT | Performed by: OBSTETRICS & GYNECOLOGY

## 2022-05-24 RX ORDER — RIZATRIPTAN BENZOATE 10 MG/1
TABLET, ORALLY DISINTEGRATING ORAL
COMMUNITY
Start: 2022-04-27

## 2022-05-24 RX ORDER — CYANOCOBALAMIN (VITAMIN B-12) 500 MCG
400 LOZENGE ORAL
COMMUNITY

## 2022-05-24 NOTE — PROGRESS NOTES
GYN Annual Exam     CC - Here for annual exam.     Subjective   HPI  Demetria Perera is a 36 y.o. female, , who presents for annual well woman exam. LMP 2022.  Periods are regular, q 24-26 days lasting 6 days with 24-36h where her bleeding will stop after CD3.  She reports moderate dysmenorrhea.  Partner Status: Marital Status: .  New Partners since last visit: no.  Desires STD Screening: no.      She has been taking vitamin E to help with breast pain.  She reports pelvic pain has progressively worsened since her surgery in 2021 where she had adhesions removed. No endo was seen at that time.   She hasnt tolerated COCs in past or mirena, and isnt interested in hormonal intervention.       Additional OB/GYN History     Current contraception: contraceptive methods: Vasectomy   Last Pap : neg  Last Completed Pap Smear          Ordered - PAP SMEAR (Every 3 Years) Ordered on 2021  SCANNED - PAP SMEAR    2020  Done - Negative              History of abnormal Pap smear: LEEP in  for moderate dysplasia. Pap smears have been normal since then.   Family history of uterine, colon, breast, or ovarian cancer: no  Previous Mammogram :  yes - . benign after diagnostic mammogram and u/s   Performs monthly Self-Breast Exam: yes  Exercises Regularly:yes  Feelings of Anxiety or Depression: no  Tobacco Usage?: No   OB History        3    Para   2    Term   2       0    AB   1    Living   2       SAB   1    IAB        Ectopic        Molar        Multiple        Live Births   2                Health Maintenance   Topic Date Due   • ANNUAL PHYSICAL  Never done   • COVID-19 Vaccine (1) Never done   • Pneumococcal Vaccine 0-64 (1 - PCV) Never done   • HEPATITIS C SCREENING  Never done   • Annual Gynecologic Pelvic and Breast Exam  2022   • INFLUENZA VACCINE  2022   • PAP SMEAR  2024   • TDAP/TD VACCINES (2 - Td or Tdap) 2031  "    [unfilled]      The additional following portions of the patient's history were reviewed and updated as appropriate: allergies, current medications, past family history, past medical history, past social history, past surgical history and problem list.    Review of Systems   Constitutional: Negative.    HENT: Negative.    Respiratory: Negative.    Cardiovascular: Negative.    Gastrointestinal: Negative.    Genitourinary: Positive for breast pain and pelvic pain.   Musculoskeletal: Negative.    Skin: Negative.    Allergic/Immunologic: Negative.    Neurological: Negative.    Hematological: Negative.    Psychiatric/Behavioral: Negative.        I have reviewed and agree with the HPI, ROS, and historical information as entered above. China Dorman MD    Objective   /70   Ht 157.5 cm (62\")   Wt 64.9 kg (143 lb)   LMP 05/17/2022 (Exact Date)   Breastfeeding No   BMI 26.16 kg/m²     Physical Exam  Vitals and nursing note reviewed. Exam conducted with a chaperone present.   Constitutional:       Appearance: She is well-developed.   HENT:      Head: Normocephalic and atraumatic.   Eyes:      Pupils: Pupils are equal, round, and reactive to light.   Neck:      Thyroid: No thyroid mass or thyromegaly.   Pulmonary:      Effort: Pulmonary effort is normal. No retractions.   Chest:      Chest wall: No mass.   Breasts:      Right: Normal. No mass, nipple discharge, skin change or tenderness.      Left: Normal. No mass, nipple discharge, skin change or tenderness.       Abdominal:      General: Bowel sounds are normal.      Palpations: Abdomen is soft. Abdomen is not rigid. There is no mass.      Tenderness: There is no abdominal tenderness. There is no guarding.      Hernia: No hernia is present. There is no hernia in the left inguinal area or right inguinal area.   Genitourinary:     Exam position: Lithotomy position.      Pubic Area: No rash.       Labia:         Right: No rash, tenderness or lesion.         " Left: No rash, tenderness or lesion.       Urethra: No urethral pain or urethral swelling.      Vagina: Normal. No vaginal discharge or lesions.      Cervix: No cervical motion tenderness, discharge, lesion or cervical bleeding.      Uterus: Normal. Not enlarged, not fixed and not tender.       Adnexa:         Right: No mass, tenderness or fullness.          Left: No mass, tenderness or fullness.        Rectum: No external hemorrhoid.   Musculoskeletal:      Cervical back: Normal range of motion. No muscular tenderness.      Right lower leg: No edema.      Left lower leg: No edema.   Skin:     General: Skin is warm and dry.   Neurological:      Mental Status: She is alert and oriented to person, place, and time.      Motor: Motor function is intact.   Psychiatric:         Mood and Affect: Mood and affect normal.         Behavior: Behavior normal.         Assessment & Plan       Encounter Diagnosis   Name Primary?   • Women's annual routine gynecological examination Yes       Plan     1. Recommended use of Vitamin D replacement and getting adequate calcium in her diet. (1500mg)  2. Reviewed monthly self breast exams.  Instructed to call with lumps, pain, or breast discharge.    3. Reviewed HPV guidelines.  4. Reviewed exercise as a preventative health measures.       China Dorman MD  05/24/2022

## 2022-05-27 LAB — REF LAB TEST METHOD: NORMAL

## 2023-03-15 ENCOUNTER — TELEPHONE (OUTPATIENT)
Dept: OBSTETRICS AND GYNECOLOGY | Facility: CLINIC | Age: 38
End: 2023-03-15
Payer: OTHER GOVERNMENT

## 2023-03-15 VITALS
WEIGHT: 142.2 LBS | SYSTOLIC BLOOD PRESSURE: 130 MMHG | BODY MASS INDEX: 26.17 KG/M2 | HEIGHT: 62 IN | DIASTOLIC BLOOD PRESSURE: 78 MMHG

## 2023-03-15 DIAGNOSIS — R10.2 PELVIC PAIN: Primary | ICD-10-CM

## 2023-03-15 LAB
BILIRUB BLD-MCNC: NEGATIVE MG/DL
CLARITY, POC: CLEAR
COLOR UR: YELLOW
GLUCOSE UR STRIP-MCNC: NEGATIVE MG/DL
KETONES UR QL: NEGATIVE
LEUKOCYTE EST, POC: NEGATIVE
NITRITE UR-MCNC: NEGATIVE MG/ML
PH UR: 6 [PH] (ref 5–8)
PROT UR STRIP-MCNC: NEGATIVE MG/DL
RBC # UR STRIP: NEGATIVE /UL
SP GR UR: 1.01 (ref 1–1.03)
UROBILINOGEN UR QL: NORMAL

## 2023-03-15 PROCEDURE — 99213 OFFICE O/P EST LOW 20 MIN: CPT | Performed by: NURSE PRACTITIONER

## 2023-03-15 PROCEDURE — 81002 URINALYSIS NONAUTO W/O SCOPE: CPT | Performed by: NURSE PRACTITIONER

## 2023-03-15 RX ORDER — ESOMEPRAZOLE MAGNESIUM 40 MG/1
40 FOR SUSPENSION ORAL DAILY
COMMUNITY

## 2023-03-15 RX ORDER — ONDANSETRON 4 MG/1
TABLET, ORALLY DISINTEGRATING ORAL
COMMUNITY
Start: 2022-12-07

## 2023-03-15 RX ORDER — RIMEGEPANT SULFATE 75 MG/75MG
TABLET, ORALLY DISINTEGRATING ORAL
COMMUNITY
Start: 2023-02-27

## 2023-03-15 RX ORDER — AMITRIPTYLINE HYDROCHLORIDE 10 MG/1
TABLET, FILM COATED ORAL
COMMUNITY
Start: 2022-12-07

## 2023-03-15 NOTE — TELEPHONE ENCOUNTER
Pt stated she thinks she may have a cyst stated she is having excruciating pelvic pain and no bleeding

## 2023-03-15 NOTE — PROGRESS NOTES
"        Pelvic Pain        HPI  Demetria Perera is a 37 y.o. female, , who presents for initial evaluation evaluation of pelvic pain.      The pain is located her RLQ that radiates to her lower back and R leg. She has experienced this problem for 9 hours. She describes the pain as \"excrutiating\". She rates her pain score as a 8/10 when at its worst. It has come and gone in waves. Patient notes aggravating factors include twist, bend down, move down and alleviating factors are voiding and bowel movement. She denies fever but did have nausea when pain was at it's worst. The patient reports additional symptoms as none.  The patient has not previously been evaluated for pelvic pain. The patient is sexually active. She has not had new partners..    Did the patient have u/s today? Yes.  Findings showed Uterus normal. Nabothian cyst of cervix. Trace amount of fluid near CS scar. Right ovary normal. Left ovary with approx 2.5 cm single septated cyst. No free fluid.  I have personally evaluated the U/S and agree with the findings. TERESSA Girard    Her last LMP was Patient's last menstrual period was 2023 (approximate).   She denies any bleeding today but states that she had her normal period on -10 with clotting and has been passing some tissue with red spotting.      Problems with GI: Yes   History of urinary disease: no  Concern for Anxiety or Depression: no  Exercises Regularly: yes  Tobacco Usage?: No     Additional OB/GYN History   Last Pap : 2022  Last Completed Pap Smear          PAP SMEAR (Every 3 Years) Next due on 2022  LIQUID-BASED PAP SMEAR, [facility] ([facility])    2021  SCANNED - PAP SMEAR    2020  Done - Negative                OB History        3    Para   2    Term   2       0    AB   1    Living   2       SAB   1    IAB        Ectopic        Molar        Multiple        Live Births   2                  Current " Outpatient Medications:   •  albuterol (PROAIR RESPICLICK) 108 (90 Base) MCG/ACT inhaler, Inhale 2 puffs., Disp: , Rfl:   •  ALLERGY SERUM SUBLINGUAL DROPS, Place  under the tongue 1 (One) Time., Disp: , Rfl:   •  amitriptyline (ELAVIL) 10 MG tablet, , Disp: , Rfl:   •  cetirizine (zyrTEC) 10 MG tablet, Take 1 tablet by mouth Daily., Disp: , Rfl:   •  EPINEPHrine (EPIPEN JR) 0.15 MG/0.3ML solution auto-injector injection, Inject 0.15 mg into the appropriate muscle as directed by prescriber., Disp: , Rfl:   •  esomeprazole (NexIUM) 40 MG packet, Take 40 mg by mouth Daily., Disp: , Rfl:   •  FIBER PO, Take  by mouth., Disp: , Rfl:   •  fluticasone (FLONASE) 50 MCG/ACT nasal spray, 2 sprays into the nostril(s) as directed by provider Daily., Disp: , Rfl:   •  Melatonin 1 MG chewable tablet, Chew., Disp: , Rfl:   •  montelukast (SINGULAIR) 10 MG tablet, Take 1 tablet by mouth Every Night., Disp: , Rfl:   •  Nurtec 75 MG dispersible tablet, DISSOLVE 1 TABLET ON THE TONGUE EVERY OTHER DAY, Disp: , Rfl:   •  ondansetron ODT (ZOFRAN-ODT) 4 MG disintegrating tablet, , Disp: , Rfl:   •  Probiotic Product (PROBIOTIC DAILY PO), Take  by mouth., Disp: , Rfl:   •  rizatriptan MLT (MAXALT-MLT) 10 MG disintegrating tablet, , Disp: , Rfl:   •  Vitamin E 400 units tablet, Take 400 Units by mouth., Disp: , Rfl:   •  azelastine (ASTELIN) 0.1 % nasal spray, , Disp: , Rfl:   •  clindamycin (CLEOCIN T) 1 % lotion, Apply  topically to the appropriate area as directed 2 (Two) Times a Day. (Patient not taking: Reported on 3/15/2023), Disp: , Rfl:   •  Levocetirizine Dihydrochloride (XYZAL PO), Take  by mouth. (Patient not taking: Reported on 3/15/2023), Disp: , Rfl:   •  Lidocaine Viscous HCl (XYLOCAINE) 2 % solution, Take 5 mL by mouth. (Patient not taking: Reported on 3/15/2023), Disp: , Rfl:   •  prednisoLONE acetate (PRED FORTE) 1 % ophthalmic suspension, As Needed. (Patient not taking: Reported on 3/15/2023), Disp: , Rfl:   •   "valACYclovir (VALTREX) 1000 MG tablet, As Needed. (Patient not taking: Reported on 3/15/2023), Disp: , Rfl:      Past Medical History:   Diagnosis Date   • Abnormal Pap smear of cervix    • Asthma    • Bilateral ovarian cysts    • Cervical dysplasia    • Dyspareunia, female    • Environmental allergies    • Fibroadenoma of breast, right    • Fibrocystic breast changes    • Gestational hypertension    • Gluten free diet    • Heart palpitations 2021    abnormal holter and EKG; baseline echo normal   • History of abnormal cervical Papanicolaou smear    • History of cold sores    • History of pregnancy induced hypertension    • HPV (human papilloma virus) infection    • Menorrhagia    • Myofascial pain     pelvic, saw PT until 2020   • PMS (premenstrual syndrome)    • UTI (urinary tract infection)         Past Surgical History:   Procedure Laterality Date   • BREAST BIOPSY     • BREAST LUMPECTOMY Right     for fibroadenoma, quarter-sized; performed by Dr. Saba.   • CERVICAL BIOPSY  W/ LOOP ELECTRODE EXCISION     •  SECTION     • COLPOSCOPY W/ BIOPSY / CURETTAGE  2009    moderate dysplasia   • GUM SURGERY  2020    graft   • LEEP  2009   • PELVIC LAPAROSCOPY     • WISDOM TOOTH EXTRACTION         The additional following portions of the patient's history were reviewed and updated as appropriate: allergies and current medications.      Review of Systems   Constitutional: Negative.    Respiratory: Negative.    Cardiovascular: Negative.    Gastrointestinal: Negative.    Genitourinary: Positive for pelvic pain.   Psychiatric/Behavioral: Negative.      All other systems reviewed and are negative.     I have reviewed and agree with the HPI, ROS, and historical information as entered above. Milli Shook, APRN    Objective   /78 (BP Location: Right arm, Patient Position: Sitting, Cuff Size: Adult)   Ht 157.5 cm (62.01\")   Wt 64.5 kg (142 lb 3.2 oz)   LMP 2023 " (Approximate)   BMI 26.00 kg/m²     Physical Exam  Vitals and nursing note reviewed.   Constitutional:       Appearance: Normal appearance. She is well-developed and normal weight.   HENT:      Head: Normocephalic and atraumatic.   Cardiovascular:      Rate and Rhythm: Normal rate and regular rhythm.   Pulmonary:      Effort: Pulmonary effort is normal.      Breath sounds: Normal breath sounds.   Abdominal:      Palpations: Abdomen is soft. Abdomen is not rigid.      Tenderness: There is no abdominal tenderness. There is no right CVA tenderness, left CVA tenderness, guarding or rebound. Negative signs include McBurney's sign.      Hernia: There is no hernia in the left inguinal area or right inguinal area.   Musculoskeletal:      Cervical back: Normal range of motion.   Neurological:      Mental Status: She is alert and oriented to person, place, and time.   Psychiatric:         Behavior: Behavior normal.         Assessment & Plan     Assessment and Plan    Problem List Items Addressed This Visit        Gastrointestinal Abdominal     Pelvic pain - Primary    Overview     Ongoing pelvic pain and dyspareunia x3 years.  She has history of  x2.  She does not tolerate hormonal contraception well.  She failed MOIZ and Mirena IUD.  Her  has a vasectomy.   No endo on dx lap - +adhesions         Relevant Orders    US Non-ob Transvaginal    POC Urinalysis Dipstick (Completed)       1. US findings discussed with pt in detail. No findings concerning for acute pain origin. She is somewhat tender on abdominal exam on RLQ but no acute abdomen symptoms. Negative McBurney's. Non-toxic appearance.  2. CCUA negative.  3. Advised she go to ER if pain returns and is severe, fever develops, or NV. She will need work up for other sources of pain.     Milli Sohok, APRN  03/15/2023

## 2023-03-15 NOTE — TELEPHONE ENCOUNTER
"Pt states that after her work out this morning she bent over and since then (7am) she has been having \"excrutiating\" pain in her RLQ that radiates to her lower back and R leg. She denies any bleeding today but states that she had her normal period on 03/4-10 with clotting and has been passing some tissue with red spotting. She states that she has not had the red spotting in the last 2 days. She denies fever but does report nausea with the pain.     Pt scheduled today for US and appt with NP after  "

## 2023-03-27 ENCOUNTER — TELEPHONE (OUTPATIENT)
Dept: OBSTETRICS AND GYNECOLOGY | Facility: CLINIC | Age: 38
End: 2023-03-27
Payer: OTHER GOVERNMENT

## 2023-03-27 NOTE — TELEPHONE ENCOUNTER
Provider: DR. GANNON    Caller: MARCI ANDERS  Relationship to Patient: SELF  Phone Number: [phone]    Reason for Call: PT CALLING SHE WAS SEEN ON 03/15 AND WAS ADVISED IF SHE HAD ANY FURTHER PAINS TO FOLLOW UP WITH ER OR PCP, PT WENT LAST [facility], NOTES ARE IN Epic, PT WENT THROUGH [facility], CALLING TO SPEAK WITH A NA IN REGARDS TO THIS      When was the patient last seen: 03/15  OK TO CALLBACK ANYTIME, OK TO LEAVE A

## 2023-03-28 NOTE — TELEPHONE ENCOUNTER
Pt calling back from yesterday states she did not get a call back and is requesting to speak with someone about concerns.

## 2023-03-28 NOTE — TELEPHONE ENCOUNTER
Per JEOVANNY sumner with plan of treatment for + yeast cultures. Plan to f/u in 1 month for repeat cultures with our office or pcp. She does not feel like this is the cause of her RLQ pain however.    Patient plans to f/u with pcp today to discuss options for further testing. States has had constant RLQ pain since 3/15/23. Rates 6/10-worse with sitting.     Patient will see pcp for repeat cultures.

## 2023-03-28 NOTE — TELEPHONE ENCOUNTER
Spoke with patient. In office 03/15/23 with RLQ pain, R flank pain. See notes and u/s.     Patient f/u with pcp due to worsening pain. States swollen lymph node in groin noted on exam. Vaginal swab sent and + candida glabrata per patient. States pcp noted this strain of yeast can be hard to treat.     Patient was started on doxy after OV with pcp 3/24/23-has since d/c after culture results. Given rx for boric acid suppository to use QHS x2 weeks, and brexafemme x1 day. She has not started yet.     pcp instructed patient to touch base with our office to discuss options for treatment.     Will discuss with JEOVANNY Shook or [place]. Instructed may be that she needs to started treatment given by pcp and then follow up for repeat culture to confirm that meds worked. She vu.     Patient denies vaginal s/s. States having continued RLQ pain, R flank pain that radiates into her hip at times.

## 2023-03-31 ENCOUNTER — TELEPHONE (OUTPATIENT)
Dept: GASTROENTEROLOGY | Facility: CLINIC | Age: 38
End: 2023-03-31
Payer: OTHER GOVERNMENT

## 2023-03-31 NOTE — TELEPHONE ENCOUNTER
Dr Mccurdy,  Patient called to schedule an appointment with you. Complains of constant right flank abdominal pain(pain scale 5). No blood or no fever. She had two normal bowel movements. Now, stool is watery. I advised patient to schedule an appointment. Last seen in 10/2021 which was EGD. Patient just show her Gynecologist at [facility] concerning this issue. Currently taking Miralax.

## 2023-04-03 ENCOUNTER — TELEPHONE (OUTPATIENT)
Dept: GASTROENTEROLOGY | Facility: CLINIC | Age: 38
End: 2023-04-03
Payer: OTHER GOVERNMENT

## 2023-04-04 ENCOUNTER — TELEPHONE (OUTPATIENT)
Dept: GASTROENTEROLOGY | Facility: CLINIC | Age: 38
End: 2023-04-04
Payer: OTHER GOVERNMENT

## 2023-04-04 RX ORDER — SODIUM, POTASSIUM,MAG SULFATES 17.5-3.13G
1 SOLUTION, RECONSTITUTED, ORAL ORAL TAKE AS DIRECTED
Qty: 354 ML | Refills: 0 | Status: SHIPPED | OUTPATIENT
Start: 2023-04-04

## 2023-04-04 NOTE — TELEPHONE ENCOUNTER
I spoke with patient this morning. Patient will try to be liquid diet for a few days before starting bowel prep and will take zofran 30 minutes before starting prep also.

## 2023-04-10 ENCOUNTER — OUTSIDE FACILITY SERVICE (OUTPATIENT)
Dept: GASTROENTEROLOGY | Facility: CLINIC | Age: 38
End: 2023-04-10
Payer: OTHER GOVERNMENT

## 2023-04-10 ENCOUNTER — LAB REQUISITION (OUTPATIENT)
Dept: LAB | Facility: HOSPITAL | Age: 38
End: 2023-04-10
Payer: OTHER GOVERNMENT

## 2023-04-10 DIAGNOSIS — K64.8 OTHER HEMORRHOIDS: ICD-10-CM

## 2023-04-10 DIAGNOSIS — R10.31 RIGHT LOWER QUADRANT PAIN: ICD-10-CM

## 2023-04-10 DIAGNOSIS — R10.9 UNSPECIFIED ABDOMINAL PAIN: ICD-10-CM

## 2023-04-10 PROCEDURE — 88305 TISSUE EXAM BY PATHOLOGIST: CPT | Performed by: INTERNAL MEDICINE

## 2023-04-10 PROCEDURE — 45380 COLONOSCOPY AND BIOPSY: CPT | Performed by: INTERNAL MEDICINE

## 2023-09-25 ENCOUNTER — OFFICE VISIT (OUTPATIENT)
Dept: OBSTETRICS AND GYNECOLOGY | Facility: CLINIC | Age: 38
End: 2023-09-25

## 2023-09-25 VITALS
DIASTOLIC BLOOD PRESSURE: 72 MMHG | SYSTOLIC BLOOD PRESSURE: 118 MMHG | WEIGHT: 143 LBS | BODY MASS INDEX: 26.31 KG/M2 | HEIGHT: 62 IN

## 2023-09-25 DIAGNOSIS — Z01.419 WOMEN'S ANNUAL ROUTINE GYNECOLOGICAL EXAMINATION: Primary | ICD-10-CM

## 2023-09-25 DIAGNOSIS — R10.2 PELVIC PAIN SYNDROME: Chronic | ICD-10-CM

## 2023-09-25 NOTE — PROGRESS NOTES
"     Gynecologic Annual Exam Note        Gynecologic Exam        Subjective     HPI  Demetria Perera is a 38 y.o.  female who presents for annual well woman exam as a established patient. There were no changes to her medical or surgical history since her last visit.. Patient reports problems with: Longer more frequent cycles and chronic pelvic pain/pressure. Women's Health PT exercises have improved. Dx w/ Myofacial pelvic pain syndrome for tense muscles. Patient's last menstrual period was 2023 (exact date).  Her periods are every 26 days, lasting 8 days . Patient reports she has a heavy flow day 2-3 using 3 tampons/ pads per hour with clotting- quarter sized. Patient reports on day 9 she is passing \"grey-mainor\" tissue. She reports dysmenorrhea is moderate to severe, premenstrually. Partner Status: Marital Status: .  She is sexually active. She has not had new partners.. STD testing recommendations have been explained to the patient and she does not desire STD testing.    Additional OB/GYN History   Current contraception: contraceptive methods: Vasectomy   Desires to: do not start contraception    History of STD: no    Last Pap : 2022. Results: negative. HPV POOL: negative.   Last Completed Pap Smear            Ordered - PAP SMEAR (Every 3 Years) Ordered on 2022  LIQUID-BASED PAP SMEAR, [facility] ([facility])    2021  SCANNED - PAP SMEAR    2020  Done - Negative                     History of abnormal Pap smear: Yes-LEEP-2009-moderate dysplasia-all normal since   Family history of uterine, colon, breast, or ovarian cancer: yes - [facility]  Mammogram: 2021-benign diagnostic and U/S  Performs monthly Self-Breast Exam: yes  Exercises Regularly:yes  Feelings of Anxiety or Depression: no  Tobacco Usage?: No       Current Outpatient Medications:     albuterol (PROAIR RESPICLICK) 108 (90 Base) MCG/ACT inhaler, Inhale 2 puffs., Disp: , Rfl:     ALLERGY " SERUM SUBLINGUAL DROPS, Place  under the tongue 1 (One) Time., Disp: , Rfl:     cetirizine (zyrTEC) 10 MG tablet, Take 1 tablet by mouth Daily., Disp: , Rfl:     EPINEPHrine (EPIPEN JR) 0.15 MG/0.3ML solution auto-injector injection, Inject 0.3 mL into the appropriate muscle as directed by prescriber., Disp: , Rfl:     FIBER PO, Take  by mouth., Disp: , Rfl:     fluticasone (FLONASE) 50 MCG/ACT nasal spray, 2 sprays into the nostril(s) as directed by provider Daily., Disp: , Rfl:     Melatonin 1 MG chewable tablet, Chew., Disp: , Rfl:     montelukast (SINGULAIR) 10 MG tablet, Take 1 tablet by mouth Every Night., Disp: , Rfl:     Nurtec 75 MG dispersible tablet, DISSOLVE 1 TABLET ON THE TONGUE EVERY OTHER DAY, Disp: , Rfl:     ondansetron ODT (ZOFRAN-ODT) 4 MG disintegrating tablet, , Disp: , Rfl:     Probiotic Product (PROBIOTIC DAILY PO), Take  by mouth., Disp: , Rfl:     rizatriptan MLT (MAXALT-MLT) 10 MG disintegrating tablet, , Disp: , Rfl:     valACYclovir (VALTREX) 1000 MG tablet, As Needed., Disp: , Rfl:     Vitamin E 400 units tablet, Take 400 Units by mouth., Disp: , Rfl:      Patient denies the need for medication refills today.    OB History          3    Para   2    Term   2       0    AB   1    Living   2         SAB   1    IAB        Ectopic        Molar        Multiple        Live Births   2                Health Maintenance   Topic Date Due    BMI FOLLOWUP  Never done    COVID-19 Vaccine (1) Never done    Pneumococcal Vaccine 0-64 (1 - PCV) Never done    HEPATITIS C SCREENING  Never done    ANNUAL PHYSICAL  Never done    MAMMOGRAM  2022    INFLUENZA VACCINE  10/01/2023    Annual Gynecologic Pelvic and Breast Exam  2024    PAP SMEAR  2025    TDAP/TD VACCINES (2 - Td or Tdap) 2031       Past Medical History:   Diagnosis Date    Abnormal Pap smear of cervix     Asthma     Bilateral ovarian cysts     Cervical dysplasia     Dyspareunia, female     Environmental  allergies     Fibroadenoma of breast, right     Fibrocystic breast changes     Gestational hypertension     Gluten free diet     Heart palpitations 2021    abnormal holter and EKG; baseline echo normal    History of abnormal cervical Papanicolaou smear 2008    History of cold sores     History of pregnancy induced hypertension     HPV (human papilloma virus) infection     Menorrhagia     Migraine     Myofascial pain     pelvic, saw PT until 2020    PMS (premenstrual syndrome)     PONV (postoperative nausea and vomiting)     Rh incompatibility     UTI (urinary tract infection)         Past Surgical History:   Procedure Laterality Date    BREAST BIOPSY      BREAST LUMPECTOMY Right     for fibroadenoma, quarter-sized; performed by Dr. Saba.    CERVICAL BIOPSY  W/ LOOP ELECTRODE EXCISION       SECTION      COLPOSCOPY W/ BIOPSY / CURETTAGE  2009    moderate dysplasia    GUM SURGERY  2020    graft    LEEP  2009    PELVIC LAPAROSCOPY      WISDOM TOOTH EXTRACTION         The additional following portions of the patient's history were reviewed and updated as appropriate: allergies, current medications, past family history, past medical history, past social history, and past surgical history.    Review of Systems   Respiratory: Negative.  Negative for shortness of breath.    Cardiovascular: Negative.  Negative for chest pain.   Gastrointestinal: Negative.  Negative for abdominal distention, abdominal pain and constipation.   Genitourinary:  Positive for menstrual problem (Longer periods closer together.), pelvic pain (Women's Health PT. Myofacial pelvic pain syndrome for tense muscles.) and pelvic pressure. Negative for breast discharge, breast lump, breast pain, dyspareunia, dysuria, urinary incontinence, vaginal bleeding, vaginal discharge and vaginal pain.   Psychiatric/Behavioral:  Negative for suicidal ideas and depressed mood. The patient is nervous/anxious.         Controlled  "with exercise.        I have reviewed and agree with the HPI, ROS, and historical information as entered above. Teresita Greco, APRN          Objective   /72 (BP Location: Right arm, Patient Position: Sitting, Cuff Size: Adult)   Ht 157.5 cm (62.01\")   Wt 64.9 kg (143 lb)   LMP 09/06/2023 (Exact Date)   BMI 26.15 kg/m²     Physical Exam  Vitals and nursing note reviewed. Exam conducted with a chaperone present.   Constitutional:       Appearance: Normal appearance. She is well-developed. She is not ill-appearing.   HENT:      Head: Normocephalic and atraumatic.   Neck:      Thyroid: No thyroid mass, thyromegaly or thyroid tenderness.   Cardiovascular:      Rate and Rhythm: Normal rate and regular rhythm.      Heart sounds: Normal heart sounds. No murmur heard.  Pulmonary:      Effort: Pulmonary effort is normal. No retractions.      Breath sounds: Normal breath sounds. No wheezing, rhonchi or rales.   Chest:      Chest wall: No mass or tenderness.   Breasts:     Breasts are symmetrical.      Right: Normal. No mass, nipple discharge, skin change or tenderness.      Left: Normal. No mass, nipple discharge, skin change or tenderness.   Abdominal:      Palpations: Abdomen is soft. Abdomen is not rigid. There is no mass.      Tenderness: There is no abdominal tenderness. There is no guarding or rebound.      Hernia: No hernia is present. There is no hernia in the left inguinal area or right inguinal area.   Genitourinary:     General: Normal vulva.      Labia:         Right: No rash, tenderness or lesion.         Left: No rash, tenderness or lesion.       Vagina: Normal. No vaginal discharge, erythema, tenderness, bleeding or lesions.      Cervix: No cervical motion tenderness, discharge, friability, lesion, erythema or cervical bleeding.      Uterus: Normal. Not enlarged, not fixed and not tender.       Adnexa: Right adnexa normal and left adnexa normal.        Right: No mass or tenderness.          Left: No " mass or tenderness.        Rectum: No external hemorrhoid.   Musculoskeletal:      Cervical back: Normal range of motion. No muscular tenderness.   Lymphadenopathy:      Upper Body:      Right upper body: No supraclavicular or axillary adenopathy.      Left upper body: No supraclavicular or axillary adenopathy.   Neurological:      Mental Status: She is alert and oriented to person, place, and time.   Psychiatric:         Mood and Affect: Mood normal.         Behavior: Behavior normal.          Assessment and Plan    Problem List Items Addressed This Visit          Cardiac and Vasculature    Pelvic pain syndrome (Chronic)    Overview     Myofascial pelvic pain syndrome- PT exercises improve pain.           Other Visit Diagnoses       Women's annual routine gynecological examination    -  Primary    Relevant Orders    LIQUID-BASED PAP SMEAR WITH HPV GENOTYPING IF ASCUS ([facility])            GYN annual well woman exam.   Reviewed pap guidelines. Pap today.  Reviewed pelvic imaging from 3/2023. Patient advised to continue PT exercises for myofascial pelvic pain syndrome.  We reviewed causes of cycle changes including perimenopause. She is unsure when her went through change. Contraception offered to improve cycles. Patient states contraception causes depression. She does not desire any intervention to improve cycles.   Fibrocystic breast changes - Encouraged decreasing caffeine, supportive bra, low dose vitamin E supplementation.  Reviewed monthly self breast exams.  Instructed to call with lumps, pain, or breast discharge.    Reviewed exercise as a preventative health measures.   Return in about 1 year (around 9/25/2024) for Annual physical or sooner if needed.      Teresita Greco, APRN  09/25/2023

## 2023-09-26 LAB — REF LAB TEST METHOD: NORMAL

## 2024-10-24 ENCOUNTER — OFFICE VISIT (OUTPATIENT)
Dept: OBSTETRICS AND GYNECOLOGY | Facility: CLINIC | Age: 39
End: 2024-10-24
Payer: COMMERCIAL

## 2024-10-24 VITALS
WEIGHT: 142.8 LBS | BODY MASS INDEX: 26.28 KG/M2 | SYSTOLIC BLOOD PRESSURE: 104 MMHG | HEIGHT: 62 IN | DIASTOLIC BLOOD PRESSURE: 78 MMHG

## 2024-10-24 DIAGNOSIS — Z01.419 WELL WOMAN EXAM: Primary | ICD-10-CM

## 2024-10-24 DIAGNOSIS — R10.2 PELVIC PAIN: ICD-10-CM

## 2024-10-24 NOTE — PROGRESS NOTES
Gynecologic Annual Exam Note        Gynecologic Exam        Subjective     HPI  Demetria Perera is a 39 y.o.  female who presents for annual well woman exam as an established patient. There were no changes to her medical or surgical history since her last visit.. Patient's last menstrual period was 10/11/2024 (exact date). Her periods occur every 24-30 days, lasting 7-10 days.  The flow is heavy (saturating super pad every 2 hours on heaviest days) with large clots (ranges from dime to quarter sized). She reports dysmenorrhea is moderate occurring premenstrually and first 1-2 days of flow. Marital Status: .  She is sexually active. She has not had new partners.. STD testing recommendations have been explained to the patient and she does not desire STD testing.    The patient would like to discuss the following complaints today: She reports nausea around time of ovulation. She reports having cyclic migraines and breast tenderness. She does not tolerate hormonal contraception well. She failed MOIZ and Mirena IUD, she is not interested in using these to help control her symptoms.     She reports passing tissue outside of her period, small pieces that are light grey or sometimes light pink. She noticed it in toilet this morning. She reports having them for years and was told that it is normal but states she's never had further evaluation done.     She has occasional pelvic pain but has back problems, history of ovarian cysts, and a known pelvic floor issue that she has worked with PFPTto treat.       Additional OB/GYN History   contraceptive methods: Vasectomy   Desires to: do not start contraception  Thromboembolic Disease: family history- PGF had a stroke  History of migraines: yes with aura. She follows-up with Neurology. Takes Nurtec and has botox done 4 times a year.      History of STD: yes HSV and HPV    Last Pap : 23. Results: negative. HPV: not done. Her last HPV testing was  05/2022..   Last Completed Pap Smear            Ordered - PAP SMEAR (Every 3 Years) Ordered on 10/24/2024      09/25/2023  LIQUID-BASED PAP SMEAR WITH HPV GENOTYPING IF ASCUS ([facility])    05/24/2022  LIQUID-BASED PAP SMEAR, [facility] ([facility])    05/20/2021  SCANNED - PAP SMEAR    05/12/2020  Done - Negative                     History of abnormal Pap smear: yes- 02/2009 LEEP for moderate dysplasia   Gardasil status:declines  Family history of uterine, colon, breast, or ovarian cancer: yes - MGGM- breast cancer, Maternal great uncle- colon cancer.  Performs monthly Self-Breast Exam: no  Exercises Regularly:yes  Feelings of Anxiety or Depression: no  Tobacco Usage?: No       Current Outpatient Medications:     albuterol (PROAIR RESPICLICK) 108 (90 Base) MCG/ACT inhaler, Inhale 2 puffs., Disp: , Rfl:     ALLERGY SERUM SUBLINGUAL DROPS, Place  under the tongue 1 (One) Time., Disp: , Rfl:     cetirizine (zyrTEC) 10 MG tablet, Take 1 tablet by mouth Daily., Disp: , Rfl:     EPINEPHrine (EPIPEN JR) 0.15 MG/0.3ML solution auto-injector injection, Inject 0.3 mL into the appropriate muscle as directed by prescriber., Disp: , Rfl:     FIBER PO, Take  by mouth., Disp: , Rfl:     fluticasone (FLONASE) 50 MCG/ACT nasal spray, Administer 2 sprays into the nostril(s) as directed by provider Daily., Disp: , Rfl:     montelukast (SINGULAIR) 10 MG tablet, Take 1 tablet by mouth Every Night., Disp: , Rfl:     Nurtec 75 MG dispersible tablet, DISSOLVE 1 TABLET ON THE TONGUE EVERY OTHER DAY, Disp: , Rfl:     Probiotic Product (PROBIOTIC DAILY PO), Take  by mouth., Disp: , Rfl:     rizatriptan MLT (MAXALT-MLT) 10 MG disintegrating tablet, , Disp: , Rfl:     valACYclovir (VALTREX) 1000 MG tablet, As Needed., Disp: , Rfl:     Vitamin E 400 units tablet, Take 400 Units by mouth., Disp: , Rfl:     Magnesium Gluconate (MAGNESIUM 27 PO), Take 1 tablet by mouth As Needed (For sleep)., Disp: , Rfl:     Melatonin 1 MG chewable tablet,  Chew. (Patient not taking: Reported on 10/24/2024), Disp: , Rfl:     ondansetron ODT (ZOFRAN-ODT) 4 MG disintegrating tablet, , Disp: , Rfl:      Patient denies the need for medication refills today.    OB History          3    Para   2    Term   2       0    AB   1    Living   2         SAB   1    IAB        Ectopic        Molar        Multiple        Live Births   2                Health Maintenance   Topic Date Due    Pneumococcal Vaccine 0-64 (1 of 2 - PCV) Never done    HEPATITIS C SCREENING  Never done    ANNUAL PHYSICAL  Never done    BMI FOLLOWUP  2021    MAMMOGRAM  2022    COVID-19 Vaccine ( season) Never done    Annual Gynecologic Pelvic and Breast Exam  2024    PAP SMEAR  2026    TDAP/TD VACCINES (2 - Td or Tdap) 2031    INFLUENZA VACCINE  Completed       Past Medical History:   Diagnosis Date    Abnormal Pap smear of cervix     Asthma     Bilateral ovarian cysts     Cervical dysplasia     Dyspareunia, female     Environmental allergies     Fibroadenoma of breast, right     Fibrocystic breast changes     Gestational hypertension     Gluten free diet     Heart palpitations 2021    abnormal holter and EKG; baseline echo normal    History of abnormal cervical Papanicolaou smear 2008    History of cold sores     History of pregnancy induced hypertension     HPV (human papilloma virus) infection     Menorrhagia     Migraine     Myofascial pain     pelvic, saw PT until 2020    PMS (premenstrual syndrome)     PONV (postoperative nausea and vomiting)     Rh incompatibility     UTI (urinary tract infection)     Varicella         Past Surgical History:   Procedure Laterality Date    BREAST BIOPSY      BREAST LUMPECTOMY Right     for fibroadenoma, quarter-sized; performed by Dr. Saba.    CERVICAL BIOPSY  W/ LOOP ELECTRODE EXCISION       SECTION      COLPOSCOPY W/ BIOPSY / CURETTAGE  2009    moderate dysplasia    GUM SURGERY   "05/2020    graft    LEEP  07/2009    PELVIC LAPAROSCOPY      WISDOM TOOTH EXTRACTION         The additional following portions of the patient's history were reviewed and updated as appropriate: allergies, current medications, past family history, past medical history, past social history, past surgical history, and problem list.    Review of Systems   Constitutional: Negative.    HENT: Negative.     Eyes: Negative.    Respiratory: Negative.     Cardiovascular: Negative.    Gastrointestinal:  Positive for nausea.   Endocrine: Negative.    Genitourinary:  Positive for menstrual problem.        Breast tenderness   Musculoskeletal: Negative.    Skin: Negative.    Allergic/Immunologic: Negative.    Neurological: Negative.    Hematological: Negative.    Psychiatric/Behavioral: Negative.           I have reviewed and agree with the HPI, ROS, and historical information as entered above. TERESSA Love          Objective   /78   Ht 157.5 cm (62.01\")   Wt 64.8 kg (142 lb 12.8 oz)   LMP 10/11/2024 (Exact Date)   BMI 26.11 kg/m²     Physical Exam  Vitals and nursing note reviewed. Exam conducted with a chaperone present.   Constitutional:       General: She is not in acute distress.     Appearance: Normal appearance. She is well-developed and normal weight. She is not ill-appearing.   Neck:      Thyroid: No thyroid mass or thyromegaly.   Pulmonary:      Effort: Pulmonary effort is normal. No respiratory distress or retractions.   Chest:      Chest wall: No mass.   Breasts:     Right: Normal. No mass, nipple discharge, skin change or tenderness.      Left: Normal. No mass, nipple discharge, skin change or tenderness.   Abdominal:      General: There is no distension.      Palpations: Abdomen is soft. Abdomen is not rigid. There is no mass.      Tenderness: There is no abdominal tenderness. There is no guarding or rebound.      Hernia: No hernia is present.   Genitourinary:     General: Normal vulva.      Exam " position: Lithotomy position.      Labia:         Right: No rash, tenderness or lesion.         Left: No rash, tenderness or lesion.       Vagina: Normal. No vaginal discharge, bleeding or lesions.      Cervix: Cervical motion tenderness present. No discharge, friability, lesion, erythema or cervical bleeding.      Uterus: Tender. Not enlarged and not fixed.       Adnexa: Right adnexa normal.        Right: No mass or tenderness.          Left: Tenderness present. No mass.        Rectum: Normal. No external hemorrhoid.   Musculoskeletal:      Cervical back: No muscular tenderness.   Skin:     General: Skin is warm and dry.   Neurological:      Mental Status: She is alert and oriented to person, place, and time.   Psychiatric:         Mood and Affect: Mood normal.         Behavior: Behavior normal.            Assessment and Plan    Problem List Items Addressed This Visit       Pelvic pain    Overview     Ongoing pelvic pain and dyspareunia x3 years.  She has history of  x2.  She does not tolerate hormonal contraception well.  She failed MOIZ and Mirena IUD.  Her  has a vasectomy.   No endo on dx lap - +adhesions         Relevant Orders    US Non-ob Transvaginal     Other Visit Diagnoses       Well woman exam    -  Primary    Relevant Orders    LIQUID-BASED PAP SMEAR WITH HPV GENOTYPING IF ASCUS ([facility])            GYN annual well woman exam.   Reviewed pap guidelines. Obtained today, she does yearly due to history of LEEP.   TVUS ordered due to pelvic pain with exam and passing tissue in between cycles.   Reviewed monthly self breast exams.  Instructed to call with lumps, pain, or breast discharge.    RTC in 1 year or PRN with problems  Return for ultrasound, soonest possible, Vel.    Swathi Crowder, APRN  10/24/2024

## 2024-10-25 ENCOUNTER — OFFICE VISIT (OUTPATIENT)
Dept: OBSTETRICS AND GYNECOLOGY | Facility: CLINIC | Age: 39
End: 2024-10-25
Payer: COMMERCIAL

## 2024-10-25 VITALS
BODY MASS INDEX: 26.29 KG/M2 | WEIGHT: 142.86 LBS | SYSTOLIC BLOOD PRESSURE: 122 MMHG | DIASTOLIC BLOOD PRESSURE: 84 MMHG | HEIGHT: 62 IN

## 2024-10-25 DIAGNOSIS — R10.2 PELVIC PAIN: Primary | ICD-10-CM

## 2024-10-25 PROBLEM — H92.01 OTALGIA OF RIGHT EAR: Status: RESOLVED | Noted: 2020-05-29 | Resolved: 2024-10-25

## 2024-10-25 PROBLEM — J30.89 ENVIRONMENTAL AND SEASONAL ALLERGIES: Status: RESOLVED | Noted: 2021-05-07 | Resolved: 2024-10-25

## 2024-10-25 PROBLEM — Z78.9 CAFFEINE USE: Status: RESOLVED | Noted: 2021-05-07 | Resolved: 2024-10-25

## 2024-10-25 PROBLEM — H61.23 CERUMEN DEBRIS ON TYMPANIC MEMBRANE OF BOTH EARS: Status: RESOLVED | Noted: 2020-05-29 | Resolved: 2024-10-25

## 2024-10-25 LAB — REF LAB TEST METHOD: NORMAL

## 2024-10-25 NOTE — PROGRESS NOTES
Pelvic Pain        HPI  Demetria Perera is a 39 y.o. female, , who presents for initial evaluation of pelvic pain.      The pain is located in the lower abdominal area and left lower quadrant and it does not radiate. She describes the pain as sharp, tender, and intermittent. Patient notes aggravating factors include exertion, palpation, and bowel movement; and alleviating factors are nothing.  The patient reports no additional symptoms. The patient has previously been evaluated and treated for pelvic pain. She was diagnosed with myofascial pelvic pain syndrome with UroGyn, and saw PFPT for it. She hasn't seen them in around a year and a half. The patient is sexually active. She has not had new partners.    Did the patient have u/s today? Yes.  Findings were unremarkable with uterus measuring 8.8cm x 3.8cm x 4.6cm. Retroverted. ES 9.1mm. Right ovary normal size with probable corpus luteum cyst. Left ovary normal size, small follicle noted. Tenderness with ultrasound at the left adnexa, abundant bowel noted left adnexa.  Pending physician review.    Patient's last menstrual period was 10/11/2024 (exact date).  Periods are  regular occur every 24-30 days , lasting  7-10  days.  Dysmenorrhea:moderate, occurring premenstrually and first 1-2 days of flow.      Problems with GI: no  History of urinary disease: no  Concern for Anxiety or Depression: no  Exercises Regularly: Yes  Tobacco Usage?: No     Additional OB/GYN History   Last Pap : 2023-Negative HPV: Not completed  Last Completed Pap Smear            PAP SMEAR (Every 3 Years) Next due on 10/24/2027      10/24/2024  LIQUID-BASED PAP SMEAR WITH HPV GENOTYPING IF ASCUS ([facility])    2023  LIQUID-BASED PAP SMEAR WITH HPV GENOTYPING IF ASCUS ([facility])    2022  LIQUID-BASED PAP SMEAR, [facility] ([facility])    2021  SCANNED - PAP SMEAR    2020  Done - Negative    Only the first 5 history entries have been loaded,  but more history exists.                    OB History          3    Para   2    Term   2       0    AB   1    Living   2         SAB   1    IAB        Ectopic        Molar        Multiple        Live Births   2                  Current Outpatient Medications:     albuterol (PROAIR RESPICLICK) 108 (90 Base) MCG/ACT inhaler, Inhale 2 puffs., Disp: , Rfl:     ALLERGY SERUM SUBLINGUAL DROPS, Place  under the tongue 1 (One) Time., Disp: , Rfl:     cetirizine (zyrTEC) 10 MG tablet, Take 1 tablet by mouth Daily., Disp: , Rfl:     EPINEPHrine (EPIPEN JR) 0.15 MG/0.3ML solution auto-injector injection, Inject 0.3 mL into the appropriate muscle as directed by prescriber., Disp: , Rfl:     FIBER PO, Take  by mouth., Disp: , Rfl:     fluticasone (FLONASE) 50 MCG/ACT nasal spray, Administer 2 sprays into the nostril(s) as directed by provider Daily., Disp: , Rfl:     Magnesium Gluconate (MAGNESIUM 27 PO), Take 1 tablet by mouth As Needed (For sleep)., Disp: , Rfl:     montelukast (SINGULAIR) 10 MG tablet, Take 1 tablet by mouth Every Night., Disp: , Rfl:     Nurtec 75 MG dispersible tablet, DISSOLVE 1 TABLET ON THE TONGUE EVERY OTHER DAY, Disp: , Rfl:     Probiotic Product (PROBIOTIC DAILY PO), Take  by mouth., Disp: , Rfl:     rizatriptan MLT (MAXALT-MLT) 10 MG disintegrating tablet, , Disp: , Rfl:     valACYclovir (VALTREX) 1000 MG tablet, As Needed., Disp: , Rfl:     Vitamin E 400 units tablet, Take 400 Units by mouth., Disp: , Rfl:      Past Medical History:   Diagnosis Date    Abnormal Pap smear of cervix     Asthma     Bilateral ovarian cysts     Cervical dysplasia     Dyspareunia, female     Environmental allergies     Fibroadenoma of breast, right 2006    Fibrocystic breast changes     Gestational hypertension     Gluten free diet     Heart palpitations 2021    abnormal holter and EKG; baseline echo normal    History of abnormal cervical Papanicolaou smear     History of cold sores     History of  "pregnancy induced hypertension     HPV (human papilloma virus) infection     Menorrhagia     Migraine     Myofascial pain     pelvic, saw PT until 2020    PMS (premenstrual syndrome)     PONV (postoperative nausea and vomiting)     Rh incompatibility     UTI (urinary tract infection)     Varicella         Past Surgical History:   Procedure Laterality Date    BREAST BIOPSY  2007    BREAST LUMPECTOMY Right 2007    for fibroadenoma, quarter-sized; performed by Dr. Saba.    CERVICAL BIOPSY  W/ LOOP ELECTRODE EXCISION       SECTION      COLPOSCOPY W/ BIOPSY / CURETTAGE  2009    moderate dysplasia    GUM SURGERY  2020    graft    LEEP  2009    PELVIC LAPAROSCOPY      WISDOM TOOTH EXTRACTION         The additional following portions of the patient's history were reviewed and updated as appropriate: allergies and current medications.      Review of Systems  All other systems reviewed and are negative.     I have reviewed and agree with the HPI, ROS, and historical information as entered above. TERESSA Love      Objective   /84 (BP Location: Right arm, Patient Position: Sitting, Cuff Size: Adult)   Ht 157.5 cm (62.01\")   Wt 64.8 kg (142 lb 13.7 oz)   LMP 10/11/2024 (Exact Date)   BMI 26.12 kg/m²     Physical Exam  Vitals and nursing note reviewed.   Constitutional:       Appearance: Normal appearance. She is normal weight.   Pulmonary:      Effort: Pulmonary effort is normal.   Musculoskeletal:         General: Normal range of motion.   Neurological:      Mental Status: She is alert and oriented to person, place, and time.         Assessment & Plan     Assessment and Plan    Problem List Items Addressed This Visit       Pelvic pain - Primary    Overview     Ongoing pelvic pain and dyspareunia x3 years.  She has history of  x2.  She does not tolerate hormonal contraception well.  She failed MOIZ and Mirena IUD.  Her  has a vasectomy.   No endo on dx lap - " +adhesions         Relevant Orders    Ambulatory Referral to Physical Therapy for Evaluation & Treatment (Completed)       Discussed ultrasound today is normal with no explanation for her tissue or pain. Dr. Dorman will review images as well.   Order placed for pt to see PFPT again-he insurance changed so she suspects she would need a new referral.   Return in about 1 year (around 10/25/2025) for Annual physical.    TERESSA Love  10/25/2024

## 2025-06-30 ENCOUNTER — OFFICE VISIT (OUTPATIENT)
Dept: GASTROENTEROLOGY | Facility: CLINIC | Age: 40
End: 2025-06-30
Payer: COMMERCIAL

## 2025-06-30 VITALS
HEIGHT: 62 IN | BODY MASS INDEX: 24.11 KG/M2 | OXYGEN SATURATION: 98 % | SYSTOLIC BLOOD PRESSURE: 120 MMHG | TEMPERATURE: 98.7 F | HEART RATE: 90 BPM | DIASTOLIC BLOOD PRESSURE: 78 MMHG | WEIGHT: 131 LBS

## 2025-06-30 DIAGNOSIS — R10.11 RUQ ABDOMINAL PAIN: ICD-10-CM

## 2025-06-30 DIAGNOSIS — R19.8 BORBORYGMI: ICD-10-CM

## 2025-06-30 DIAGNOSIS — R19.7 DIARRHEA OF PRESUMED INFECTIOUS ORIGIN: Primary | ICD-10-CM

## 2025-06-30 PROCEDURE — 99214 OFFICE O/P EST MOD 30 MIN: CPT | Performed by: NURSE PRACTITIONER

## 2025-06-30 NOTE — PROGRESS NOTES
Follow Up      Date: 2025   Patient Name: Demetria Perera  : 1985   MRN: [id number]     Chief Complaint:    Chief Complaint   Patient presents with    Abdominal Pain     Health Maintenance Follow Up    Weight Loss       History of Present Illness: Demetria Perera is a 40 y.o. female who is here today for follow up on abdominal pain.  She started experiencing GI symptoms at the end of February/beginning of March.  Ended up losing weight during her [place] vacation, which was slightly unexpected.  In May she went to [place] and 2 days later she started having GI issues again.  Lost 5 to 6 pounds over the weekend, with diarrhea. Since this time, her symptoms have not returned to baseline. She is having Nocturnal diarrhea, as well as insomnia due to this and tachycardia. She has a decreased appetite and borborygmi after eating. PCP check stool studies , and they were negative for ova and parasites, C. Diff and stool culture was normal. Lost a total of around 10 lbs since March and has not been able to regain this. Continue to have food aversions as well as loose stools (BSFS type 6). Has not had nocturnal diarrhea the last few nights. She was prescribed Flagyl, but did not finish this medication once her stool studies came back negative, because of side effects.     Sudden severe ruq pain that radiated through to her back last Monday. 30 min after eating. Eventually it passed, but the pain was so intense it felt like she had an out of body experience. Since this she has experienced intermittent RUQ discomfort. Was not having pain in May. Denies symptoms of acid reflux. Does not take NSAIDs. Can have bowel issues in RLQ due to back issues, but this was in her RUQ.     Dr. Mathew treated her for a parasite, for around a year, when she was 17.       EGD (10/14/2021) small hiatal hernia. LA Grade A reflux esophagitis. Otherwise normal.   Path: normal small bowel biopsy. Minimal chronic  inactive gastritis on gastric body and antral biopsy. Minimal chronic inactive inflammation on distal esophageal biopsy. Normal mid esophageal biopsy.   COLONOSCOPY (04/10/2023) For RLQ abdominal pain/right flank pain, per Dr. Mccurdy: internal hemorrhoids. Otherwise normal ileocolonoscopy. 5 year repeat recommended  Path: normal random colon biopsies  CT Abdomen Pelvis With Contrast (06/27/2025) no acute findings. Normal CBD and gallbladder unremarkable.   Previous exploratory Lap showed significant adhesions to the uterus and abdominal wall. The ovaries were within normal limits. There was no direct evidence for endometriosis.   ultrasound and HIDA scan that were unremarkable for a gallbladder etiology in 2021    Subjective      Review of Systems:   Review of Systems   Constitutional:  Positive for appetite change and unexpected weight loss.   Gastrointestinal:  Positive for abdominal pain, diarrhea and nausea. Negative for blood in stool.       I have reviewed the patients family history, social history, past medical history, past surgical history and have updated it as appropriate.     Medications:     Current Outpatient Medications:     albuterol (PROAIR RESPICLICK) 108 (90 Base) MCG/ACT inhaler, Inhale 2 puffs., Disp: , Rfl:     ALLERGY SERUM SUBLINGUAL DROPS, Place  under the tongue 1 (One) Time., Disp: , Rfl:     cetirizine (zyrTEC) 10 MG tablet, Take 1 tablet by mouth Daily., Disp: , Rfl:     EPINEPHrine (EPIPEN JR) 0.15 MG/0.3ML solution auto-injector injection, Inject 0.3 mL into the appropriate muscle as directed by prescriber., Disp: , Rfl:     FIBER PO, Take  by mouth., Disp: , Rfl:     fluticasone (FLONASE) 50 MCG/ACT nasal spray, Administer 2 sprays into the nostril(s) as directed by provider Daily., Disp: , Rfl:     Magnesium Gluconate (MAGNESIUM 27 PO), Take 1 tablet by mouth As Needed (For sleep)., Disp: , Rfl:     Nurtec 75 MG dispersible tablet, DISSOLVE 1 TABLET ON THE TONGUE EVERY OTHER DAY,  "Disp: , Rfl:     Probiotic Product (PROBIOTIC DAILY PO), Take  by mouth., Disp: , Rfl:     rizatriptan MLT (MAXALT-MLT) 10 MG disintegrating tablet, , Disp: , Rfl:     valACYclovir (VALTREX) 1000 MG tablet, As Needed., Disp: , Rfl:     Vitamin E 400 units tablet, Take 400 Units by mouth., Disp: , Rfl:     Allergies:   Allergies   Allergen Reactions    Bactrim [Sulfamethoxazole-Trimethoprim] Anaphylaxis    Gluten Meal Other (See Comments)     GI DISTRESS    Latex, Natural Rubber Anaphylaxis    Shellfish-Derived Products Anaphylaxis    Latex Swelling     ulcers    Nuts Headache    Oxycodone-Acetaminophen Provider Review Needed    Barley Grass Diarrhea and Nausea Only    Buckwheat Diarrhea, Nausea And Vomiting and GI Intolerance    Penicillins Hives and Rash         Objective     Physical Exam:  Vital Signs:   Vitals:    06/30/25 1024   BP: 120/78   Pulse: 90   Temp: 98.7 °F (37.1 °C)   TempSrc: Temporal   SpO2: 98%   Weight: 59.4 kg (131 lb)   Height: 157.5 cm (62.01\")   PainSc: 4    PainLoc: Abdomen     Body mass index is 23.95 kg/m².     Metrics:   Demetria Perera  reports that she has never smoked. She has never used smokeless tobacco.      Colonoscopy/Colon Cancer Screening: due 4/2028   COLONOSCOPY (04/10/2023) For RLQ abdominal pain/right flank pain, per Dr. Mccurdy: internal hemorrhoids. Otherwise normal ileocolonoscopy. 5 year repeat recommended  Path: normal random colon biopsies    Physical Exam  Vitals and nursing note reviewed.   Constitutional:       General: She is not in acute distress.     Appearance: Normal appearance. She is well-developed.   HENT:      Head: Normocephalic and atraumatic.      Right Ear: External ear normal.      Left Ear: External ear normal.      Nose: Nose normal.      Mouth/Throat:      Mouth: Mucous membranes are moist.   Eyes:      Extraocular Movements: Extraocular movements intact.      Conjunctiva/sclera: Conjunctivae normal.      Pupils: Pupils are equal, round, and " reactive to light.   Cardiovascular:      Rate and Rhythm: Normal rate and regular rhythm.      Heart sounds: Normal heart sounds.   Pulmonary:      Effort: Pulmonary effort is normal.      Breath sounds: Normal breath sounds.   Abdominal:      General: Abdomen is flat. Bowel sounds are normal. There is no distension.      Palpations: Abdomen is soft.      Tenderness: There is no abdominal tenderness.      Hernia: No hernia is present.   Musculoskeletal:         General: Normal range of motion.      Cervical back: Normal range of motion.   Skin:     General: Skin is warm and dry.   Neurological:      General: No focal deficit present.      Mental Status: She is alert and oriented to person, place, and time.   Psychiatric:         Attention and Perception: Attention and perception normal.         Mood and Affect: Mood normal.         Speech: Speech normal.         Behavior: Behavior normal. Behavior is cooperative.         Thought Content: Thought content normal.         Cognition and Memory: Cognition normal.         Judgment: Judgment normal.         Assessment / Plan      Assessment/Plan:   Diagnoses and all orders for this visit:    1. Diarrhea of presumed infectious origin (Primary)  -     Ova & Parasite Examination - Stool, Per Rectum; Future  -     Calprotectin, Fecal - Stool, Per Rectum; Future  -     Gastrointestinal Panel, PCR - Stool, Per Rectum; Future  -     H. Pylori Antigen, Stool - Stool, Per Rectum; Future    2. Borborygmi  -     Ova & Parasite Examination - Stool, Per Rectum; Future  -     Calprotectin, Fecal - Stool, Per Rectum; Future  -     Gastrointestinal Panel, PCR - Stool, Per Rectum; Future  -     H. Pylori Antigen, Stool - Stool, Per Rectum; Future    3. RUQ abdominal pain  -     US Gallbladder; Future       Evaluate for gallstones with U/S. May proceed with HIDA scan, but will await results.   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    Plan of care reviewed with the patient at the conclusion of today's visit.  Education was provided regarding diagnosis, management, and any prescribed or recommended OTC medications.  Patient verbalized understanding of and agreement with management plan.     NOTE TO PATIENT: The 21st Century Cures Act makes medical notes like these available to patients in the interest of transparency. However, be advised this is a medical document. It is intended as peer to peer communication. It is written in medical language and may contain abbreviations or verbiage that are unfamiliar. It may appear blunt or direct. Medical documents are intended to carry relevant information, facts as evident, and the clinical opinion of the practitioner.     TERESSA Cantrell  [facility]

## 2025-07-01 ENCOUNTER — LAB (OUTPATIENT)
Dept: LAB | Facility: HOSPITAL | Age: 40
End: 2025-07-01
Payer: COMMERCIAL

## 2025-07-01 DIAGNOSIS — R19.8 BORBORYGMI: ICD-10-CM

## 2025-07-01 DIAGNOSIS — R19.7 DIARRHEA OF PRESUMED INFECTIOUS ORIGIN: ICD-10-CM

## 2025-07-01 LAB

## 2025-07-01 PROCEDURE — 87507 IADNA-DNA/RNA PROBE TQ 12-25: CPT

## 2025-07-01 PROCEDURE — 83993 ASSAY FOR CALPROTECTIN FECAL: CPT

## 2025-07-01 PROCEDURE — 87209 SMEAR COMPLEX STAIN: CPT

## 2025-07-01 PROCEDURE — 87177 OVA AND PARASITES SMEARS: CPT

## 2025-07-01 PROCEDURE — 87338 HPYLORI STOOL AG IA: CPT

## 2025-07-02 LAB — H PYLORI AG STL QL IA: NEGATIVE

## 2025-07-03 LAB — CALPROTECTIN STL-MCNT: 12 UG/G (ref 0–120)

## 2025-07-08 ENCOUNTER — HOSPITAL ENCOUNTER (OUTPATIENT)
Dept: ULTRASOUND IMAGING | Facility: HOSPITAL | Age: 40
Discharge: HOME | End: 2025-07-08
Admitting: NURSE PRACTITIONER
Payer: COMMERCIAL

## 2025-07-08 DIAGNOSIS — R10.11 RUQ ABDOMINAL PAIN: ICD-10-CM

## 2025-07-08 LAB
O+P SPEC MICRO: NORMAL
O+P STL CONC: NORMAL

## 2025-07-08 PROCEDURE — 76705 ECHO EXAM OF ABDOMEN: CPT

## 2025-07-10 ENCOUNTER — RESULTS FOLLOW-UP (OUTPATIENT)
Dept: GASTROENTEROLOGY | Facility: CLINIC | Age: 40
End: 2025-07-10
Payer: COMMERCIAL

## 2025-07-10 DIAGNOSIS — K58.0 IRRITABLE BOWEL SYNDROME WITH DIARRHEA: Primary | ICD-10-CM

## 2025-07-10 NOTE — LETTER
Demetria De Jesus Perera  [address] [address]    July 10, 2025     Dear Ms. Perera:    Below are the results from your recent visit:    Resulted Orders   Ova & Parasite Examination - Stool, Per Rectum   Result Value Ref Range    Ova + Parasite Exam Final report       Comment:      These results were obtained using wet preparation(s) and trichrome  stained smear. This test does not include testing for Cryptosporidium  parvum, Cyclospora, or Microsporidia.    Ova + Parasite Result 1 Comment       Comment:      No ova, cysts, or parasites seen.  One negative specimen does not rule out the possibility of a  parasitic infection.   Calprotectin, Fecal - Stool, Per Rectum   Result Value Ref Range    Calprotectin, Fecal 12 0 - 120 ug/g      Comment:      Concentration     Interpretation   Follow-Up  < 5 - 50 ug/g     Normal           None  >50 -120 ug/g     Borderline       Re-evaluate in 4-6 weeks      >120 ug/g     Abnormal         Repeat as clinically                                     indicated   Gastrointestinal Panel, PCR - Stool, Per Rectum   Result Value Ref Range    Campylobacter Not Detected Not Detected    Plesiomonas shigelloides Not Detected Not Detected    Salmonella Not Detected Not Detected    Vibrio Not Detected Not Detected    Vibrio cholerae Not Detected Not Detected    Yersinia enterocolitica Not Detected Not Detected    Enteroaggregative E. coli (EAEC) Not Detected Not Detected    Enteropathogenic E. coli (EPEC) Not Detected Not Detected    Enterotoxigenic E. coli (ETEC) lt/st Not Detected Not Detected    Shiga-like toxin-producing E. coli (STEC) stx1/stx2 Not Detected Not Detected    Shigella/Enteroinvasive E. coli (EIEC) Not Detected Not Detected    Cryptosporidium Not Detected Not Detected    Cyclospora cayetanensis Not Detected Not Detected    Entamoeba histolytica Not Detected Not Detected    Giardia lamblia Not Detected Not Detected    Adenovirus F40/41 Not Detected Not  Detected    Astrovirus Not Detected Not Detected    Norovirus GI/GII Not Detected Not Detected    Rotavirus A Not Detected Not Detected    Sapovirus (I, II, IV or V) Not Detected Not Detected   H. Pylori Antigen, Stool - Stool, Per Rectum   Result Value Ref Range    H pylori Ag, Stl Negative Negative       Your stools studies are all normal, without evidence of inflammatory or infectious diarrhea. I will send in a prescription for Xifaxan for suspected post infectious IBS-D to your pharmacy. Take this medication 3 times per day for 14 days. I will put refills on your prescription, but please do not continue taking the medication. These are for later use, if you need them.    Ova & Parasite Examination - Stool, Per Rectum; Calprotectin, Fecal - Stool, Per Rectum; H. Pylori Antigen, Stool - Stool, Per Rectum; Gastrointestinal Panel, PCR - Stool, Per Rectum    If you have any questions or concerns, please don't hesitate to call.         Sincerely,        TERESSA Walden

## 2025-07-16 ENCOUNTER — TELEPHONE (OUTPATIENT)
Dept: GASTROENTEROLOGY | Facility: CLINIC | Age: 40
End: 2025-07-16
Payer: COMMERCIAL

## 2025-07-16 NOTE — TELEPHONE ENCOUNTER
Lauren,   Patient has started the xifaxan yesterday she states since she has started the medicine it has caused her to be bloated more bowel movements then she had before. She states she did get US done and wanted to update you.  Also she is complaining about itching she states this started before she start th xifaxan she just wanted to be aware of this.

## 2025-07-18 ENCOUNTER — DOCUMENTATION (OUTPATIENT)
Dept: GASTROENTEROLOGY | Facility: CLINIC | Age: 40
End: 2025-07-18
Payer: COMMERCIAL

## 2025-07-18 DIAGNOSIS — L29.9 PRURITUS: Primary | ICD-10-CM

## 2025-07-18 DIAGNOSIS — R19.7 DIARRHEA OF PRESUMED INFECTIOUS ORIGIN: Primary | ICD-10-CM

## 2025-07-18 DIAGNOSIS — L29.9 PRURITUS: ICD-10-CM

## 2025-07-18 DIAGNOSIS — K21.9 GASTROESOPHAGEAL REFLUX DISEASE, UNSPECIFIED WHETHER ESOPHAGITIS PRESENT: ICD-10-CM

## 2025-07-18 RX ORDER — PANTOPRAZOLE SODIUM 40 MG/1
40 TABLET, DELAYED RELEASE ORAL DAILY
Qty: 90 TABLET | Refills: 3 | Status: SHIPPED | OUTPATIENT
Start: 2025-07-18

## 2025-07-31 ENCOUNTER — HOSPITAL ENCOUNTER (OUTPATIENT)
Dept: NUCLEAR MEDICINE | Facility: HOSPITAL | Age: 40
Discharge: HOME | End: 2025-07-31
Admitting: NURSE PRACTITIONER
Payer: COMMERCIAL

## 2025-07-31 DIAGNOSIS — R10.11 RUQ ABDOMINAL PAIN: ICD-10-CM

## 2025-07-31 PROCEDURE — 34310000005 TECHNETIUM TC 99M MEBROFENIN KIT: Performed by: NURSE PRACTITIONER

## 2025-07-31 PROCEDURE — 25010000002 SINCALIDE PER 5 MCG: Performed by: NURSE PRACTITIONER

## 2025-07-31 PROCEDURE — 78227 HEPATOBIL SYST IMAGE W/DRUG: CPT

## 2025-07-31 PROCEDURE — A9537 TC99M MEBROFENIN: HCPCS | Performed by: NURSE PRACTITIONER

## 2025-07-31 RX ORDER — KIT FOR THE PREPARATION OF TECHNETIUM TC 99M MEBROFENIN 45 MG/10ML
1 INJECTION, POWDER, LYOPHILIZED, FOR SOLUTION INTRAVENOUS
Status: COMPLETED | OUTPATIENT
Start: 2025-07-31 | End: 2025-07-31

## 2025-07-31 RX ADMIN — MEBROFENIN 1 DOSE: 45 INJECTION, POWDER, LYOPHILIZED, FOR SOLUTION INTRAVENOUS at 07:23

## 2025-07-31 RX ADMIN — SINCALIDE 1.2 MCG: 5 INJECTION, POWDER, LYOPHILIZED, FOR SOLUTION INTRAVENOUS at 08:26

## 2025-08-03 ENCOUNTER — RESULTS FOLLOW-UP (OUTPATIENT)
Dept: GASTROENTEROLOGY | Facility: CLINIC | Age: 40
End: 2025-08-03
Payer: COMMERCIAL